# Patient Record
Sex: MALE | Race: WHITE | NOT HISPANIC OR LATINO
[De-identification: names, ages, dates, MRNs, and addresses within clinical notes are randomized per-mention and may not be internally consistent; named-entity substitution may affect disease eponyms.]

---

## 2017-01-01 ENCOUNTER — APPOINTMENT (OUTPATIENT)
Dept: CARDIOLOGY | Facility: CLINIC | Age: 67
End: 2017-01-01

## 2017-01-01 ENCOUNTER — APPOINTMENT (OUTPATIENT)
Dept: NUCLEAR MEDICINE | Facility: CLINIC | Age: 67
End: 2017-01-01

## 2017-01-01 ENCOUNTER — OUTPATIENT (OUTPATIENT)
Dept: OUTPATIENT SERVICES | Facility: HOSPITAL | Age: 67
LOS: 1 days | End: 2017-01-01
Payer: COMMERCIAL

## 2017-01-01 ENCOUNTER — NON-APPOINTMENT (OUTPATIENT)
Age: 67
End: 2017-01-01

## 2017-01-01 ENCOUNTER — INPATIENT (INPATIENT)
Facility: HOSPITAL | Age: 67
LOS: 3 days | Discharge: HOME | DRG: 181 | End: 2017-05-10
Attending: INTERNAL MEDICINE | Admitting: INTERNAL MEDICINE
Payer: COMMERCIAL

## 2017-01-01 ENCOUNTER — TRANSCRIPTION ENCOUNTER (OUTPATIENT)
Age: 67
End: 2017-01-01

## 2017-01-01 ENCOUNTER — RX RENEWAL (OUTPATIENT)
Age: 67
End: 2017-01-01

## 2017-01-01 ENCOUNTER — APPOINTMENT (OUTPATIENT)
Dept: THORACIC SURGERY | Facility: CLINIC | Age: 67
End: 2017-01-01

## 2017-01-01 ENCOUNTER — INPATIENT (INPATIENT)
Facility: HOSPITAL | Age: 67
LOS: 2 days | Discharge: HOME | DRG: 180 | End: 2017-06-16
Attending: HOSPITALIST | Admitting: THORACIC SURGERY (CARDIOTHORACIC VASCULAR SURGERY)
Payer: COMMERCIAL

## 2017-01-01 ENCOUNTER — APPOINTMENT (OUTPATIENT)
Dept: CT IMAGING | Facility: CLINIC | Age: 67
End: 2017-01-01

## 2017-01-01 ENCOUNTER — OUTPATIENT (OUTPATIENT)
Dept: OUTPATIENT SERVICES | Facility: HOSPITAL | Age: 67
LOS: 1 days | End: 2017-01-01

## 2017-01-01 ENCOUNTER — MEDICATION RENEWAL (OUTPATIENT)
Age: 67
End: 2017-01-01

## 2017-01-01 ENCOUNTER — APPOINTMENT (OUTPATIENT)
Dept: PULMONOLOGY | Facility: CLINIC | Age: 67
End: 2017-01-01

## 2017-01-01 ENCOUNTER — APPOINTMENT (OUTPATIENT)
Dept: THORACIC SURGERY | Facility: HOSPITAL | Age: 67
End: 2017-01-01

## 2017-01-01 ENCOUNTER — INPATIENT (INPATIENT)
Facility: HOSPITAL | Age: 67
LOS: 3 days | Discharge: HOME | DRG: 167 | End: 2017-05-28
Attending: THORACIC SURGERY (CARDIOTHORACIC VASCULAR SURGERY) | Admitting: FAMILY MEDICINE
Payer: COMMERCIAL

## 2017-01-01 VITALS
TEMPERATURE: 98 F | OXYGEN SATURATION: 91 % | RESPIRATION RATE: 22 BRPM | HEART RATE: 102 BPM | SYSTOLIC BLOOD PRESSURE: 133 MMHG | DIASTOLIC BLOOD PRESSURE: 64 MMHG | HEIGHT: 71 IN | WEIGHT: 238.1 LBS

## 2017-01-01 VITALS — DIASTOLIC BLOOD PRESSURE: 78 MMHG | SYSTOLIC BLOOD PRESSURE: 122 MMHG | OXYGEN SATURATION: 94 % | HEART RATE: 99 BPM

## 2017-01-01 VITALS
DIASTOLIC BLOOD PRESSURE: 58 MMHG | WEIGHT: 228 LBS | OXYGEN SATURATION: 90 % | SYSTOLIC BLOOD PRESSURE: 122 MMHG | HEART RATE: 74 BPM | BODY MASS INDEX: 31.8 KG/M2

## 2017-01-01 VITALS
HEART RATE: 74 BPM | OXYGEN SATURATION: 92 % | DIASTOLIC BLOOD PRESSURE: 64 MMHG | RESPIRATION RATE: 18 BRPM | TEMPERATURE: 98 F | SYSTOLIC BLOOD PRESSURE: 104 MMHG

## 2017-01-01 VITALS
OXYGEN SATURATION: 96 % | SYSTOLIC BLOOD PRESSURE: 106 MMHG | WEIGHT: 234 LBS | BODY MASS INDEX: 32.64 KG/M2 | DIASTOLIC BLOOD PRESSURE: 66 MMHG | HEART RATE: 74 BPM

## 2017-01-01 VITALS
WEIGHT: 225 LBS | BODY MASS INDEX: 31.5 KG/M2 | HEIGHT: 71 IN | OXYGEN SATURATION: 96 % | DIASTOLIC BLOOD PRESSURE: 78 MMHG | SYSTOLIC BLOOD PRESSURE: 143 MMHG | HEART RATE: 83 BPM

## 2017-01-01 VITALS
BODY MASS INDEX: 32.76 KG/M2 | DIASTOLIC BLOOD PRESSURE: 64 MMHG | RESPIRATION RATE: 16 BRPM | WEIGHT: 234 LBS | HEIGHT: 71 IN | OXYGEN SATURATION: 93 % | SYSTOLIC BLOOD PRESSURE: 109 MMHG | HEART RATE: 82 BPM

## 2017-01-01 VITALS
HEIGHT: 71 IN | WEIGHT: 229.94 LBS | HEART RATE: 100 BPM | DIASTOLIC BLOOD PRESSURE: 76 MMHG | TEMPERATURE: 97 F | SYSTOLIC BLOOD PRESSURE: 138 MMHG | OXYGEN SATURATION: 93 % | RESPIRATION RATE: 18 BRPM

## 2017-01-01 VITALS
DIASTOLIC BLOOD PRESSURE: 73 MMHG | SYSTOLIC BLOOD PRESSURE: 133 MMHG | BODY MASS INDEX: 33.88 KG/M2 | HEART RATE: 113 BPM | WEIGHT: 242 LBS | OXYGEN SATURATION: 95 % | HEIGHT: 71 IN

## 2017-01-01 VITALS
BODY MASS INDEX: 33.6 KG/M2 | DIASTOLIC BLOOD PRESSURE: 78 MMHG | WEIGHT: 240 LBS | HEART RATE: 112 BPM | HEIGHT: 71 IN | OXYGEN SATURATION: 96 % | SYSTOLIC BLOOD PRESSURE: 129 MMHG

## 2017-01-01 VITALS
RESPIRATION RATE: 16 BRPM | OXYGEN SATURATION: 96 % | TEMPERATURE: 98 F | SYSTOLIC BLOOD PRESSURE: 112 MMHG | DIASTOLIC BLOOD PRESSURE: 62 MMHG | HEART RATE: 76 BPM

## 2017-01-01 VITALS
HEART RATE: 80 BPM | DIASTOLIC BLOOD PRESSURE: 60 MMHG | SYSTOLIC BLOOD PRESSURE: 140 MMHG | RESPIRATION RATE: 20 BRPM | OXYGEN SATURATION: 90 %

## 2017-01-01 VITALS — TEMPERATURE: 98 F

## 2017-01-01 VITALS — OXYGEN SATURATION: 91 %

## 2017-01-01 DIAGNOSIS — I10 ESSENTIAL (PRIMARY) HYPERTENSION: ICD-10-CM

## 2017-01-01 DIAGNOSIS — C34.90 MALIGNANT NEOPLASM OF UNSPECIFIED PART OF UNSPECIFIED BRONCHUS OR LUNG: ICD-10-CM

## 2017-01-01 DIAGNOSIS — J90 PLEURAL EFFUSION, NOT ELSEWHERE CLASSIFIED: ICD-10-CM

## 2017-01-01 DIAGNOSIS — Z87.891 PERSONAL HISTORY OF NICOTINE DEPENDENCE: ICD-10-CM

## 2017-01-01 DIAGNOSIS — R60.0 LOCALIZED EDEMA: ICD-10-CM

## 2017-01-01 DIAGNOSIS — C79.9 SECONDARY MALIGNANT NEOPLASM OF UNSPECIFIED SITE: ICD-10-CM

## 2017-01-01 DIAGNOSIS — E78.00 PURE HYPERCHOLESTEROLEMIA, UNSPECIFIED: ICD-10-CM

## 2017-01-01 DIAGNOSIS — C34.91 MALIGNANT NEOPLASM OF UNSPECIFIED PART OF RIGHT BRONCHUS OR LUNG: ICD-10-CM

## 2017-01-01 DIAGNOSIS — R06.02 SHORTNESS OF BREATH: ICD-10-CM

## 2017-01-01 DIAGNOSIS — Z86.39 PERSONAL HISTORY OF OTHER ENDOCRINE, NUTRITIONAL AND METABOLIC DISEASE: ICD-10-CM

## 2017-01-01 DIAGNOSIS — Z00.8 ENCOUNTER FOR OTHER GENERAL EXAMINATION: ICD-10-CM

## 2017-01-01 DIAGNOSIS — J91.0 MALIGNANT PLEURAL EFFUSION: ICD-10-CM

## 2017-01-01 DIAGNOSIS — I50.9 HEART FAILURE, UNSPECIFIED: ICD-10-CM

## 2017-01-01 DIAGNOSIS — Z01.810 ENCOUNTER FOR PREPROCEDURAL CARDIOVASCULAR EXAMINATION: ICD-10-CM

## 2017-01-01 DIAGNOSIS — C34.81 MALIGNANT NEOPLASM OF OVERLAPPING SITES OF RIGHT BRONCHUS AND LUNG: ICD-10-CM

## 2017-01-01 DIAGNOSIS — I48.91 UNSPECIFIED ATRIAL FIBRILLATION: ICD-10-CM

## 2017-01-01 DIAGNOSIS — I48.2 CHRONIC ATRIAL FIBRILLATION: ICD-10-CM

## 2017-01-01 DIAGNOSIS — I31.3 PERICARDIAL EFFUSION (NONINFLAMMATORY): ICD-10-CM

## 2017-01-01 DIAGNOSIS — Z29.9 ENCOUNTER FOR PROPHYLACTIC MEASURES, UNSPECIFIED: ICD-10-CM

## 2017-01-01 DIAGNOSIS — E11.9 TYPE 2 DIABETES MELLITUS WITHOUT COMPLICATIONS: ICD-10-CM

## 2017-01-01 DIAGNOSIS — R05 COUGH: ICD-10-CM

## 2017-01-01 DIAGNOSIS — Z51.5 ENCOUNTER FOR PALLIATIVE CARE: ICD-10-CM

## 2017-01-01 DIAGNOSIS — R91.8 OTHER NONSPECIFIC ABNORMAL FINDING OF LUNG FIELD: ICD-10-CM

## 2017-01-01 DIAGNOSIS — C34.11 MALIGNANT NEOPLASM OF UPPER LOBE, RIGHT BRONCHUS OR LUNG: ICD-10-CM

## 2017-01-01 DIAGNOSIS — R91.1 SOLITARY PULMONARY NODULE: ICD-10-CM

## 2017-01-01 LAB
ALBUMIN FLD-MCNC: 2.7 G/DL — SIGNIFICANT CHANGE UP
ALBUMIN SERPL ELPH-MCNC: 2.7 G/DL — LOW (ref 3.3–5.2)
ALBUMIN SERPL ELPH-MCNC: 3.4 G/DL — SIGNIFICANT CHANGE UP (ref 3.3–5.2)
ALBUMIN SERPL ELPH-MCNC: 3.5 G/DL — SIGNIFICANT CHANGE UP (ref 3.3–5.2)
ALP SERPL-CCNC: 133 U/L — HIGH (ref 40–120)
ALP SERPL-CCNC: 93 U/L — SIGNIFICANT CHANGE UP (ref 40–120)
ALP SERPL-CCNC: 94 U/L — SIGNIFICANT CHANGE UP (ref 40–120)
ALT FLD-CCNC: 17 U/L — SIGNIFICANT CHANGE UP
ALT FLD-CCNC: 8 U/L — SIGNIFICANT CHANGE UP
ALT FLD-CCNC: 8 U/L — SIGNIFICANT CHANGE UP
ANION GAP SERPL CALC-SCNC: 11 MMOL/L — SIGNIFICANT CHANGE UP (ref 5–17)
ANION GAP SERPL CALC-SCNC: 12 MMOL/L — SIGNIFICANT CHANGE UP (ref 5–17)
ANION GAP SERPL CALC-SCNC: 12 MMOL/L — SIGNIFICANT CHANGE UP (ref 5–17)
ANION GAP SERPL CALC-SCNC: 13 MMOL/L — SIGNIFICANT CHANGE UP (ref 5–17)
ANION GAP SERPL CALC-SCNC: 14 MMOL/L — SIGNIFICANT CHANGE UP (ref 5–17)
ANION GAP SERPL CALC-SCNC: 14 MMOL/L — SIGNIFICANT CHANGE UP (ref 5–17)
ANION GAP SERPL CALC-SCNC: 15 MMOL/L — SIGNIFICANT CHANGE UP (ref 5–17)
APTT BLD: 35.4 SEC — SIGNIFICANT CHANGE UP (ref 27.5–37.4)
APTT BLD: 38.4 SEC — HIGH (ref 27.5–37.4)
AST SERPL-CCNC: 11 U/L — SIGNIFICANT CHANGE UP
AST SERPL-CCNC: 11 U/L — SIGNIFICANT CHANGE UP
AST SERPL-CCNC: 27 U/L — SIGNIFICANT CHANGE UP
BASOPHILS # BLD AUTO: 0 K/UL — SIGNIFICANT CHANGE UP (ref 0–0.2)
BASOPHILS NFR BLD AUTO: 0.5 % — SIGNIFICANT CHANGE UP (ref 0–2)
BILIRUB SERPL-MCNC: 0.3 MG/DL — LOW (ref 0.4–2)
BILIRUB SERPL-MCNC: 0.4 MG/DL — SIGNIFICANT CHANGE UP (ref 0.4–2)
BILIRUB SERPL-MCNC: 0.6 MG/DL — SIGNIFICANT CHANGE UP (ref 0.4–2)
BLD GP AB SCN SERPL QL: SIGNIFICANT CHANGE UP
BUN SERPL-MCNC: 14 MG/DL — SIGNIFICANT CHANGE UP (ref 8–20)
BUN SERPL-MCNC: 17 MG/DL — SIGNIFICANT CHANGE UP (ref 8–20)
BUN SERPL-MCNC: 18 MG/DL — SIGNIFICANT CHANGE UP (ref 8–20)
BUN SERPL-MCNC: 21 MG/DL — HIGH (ref 8–20)
BUN SERPL-MCNC: 23 MG/DL — HIGH (ref 8–20)
BUN SERPL-MCNC: 23 MG/DL — HIGH (ref 8–20)
BUN SERPL-MCNC: 26 MG/DL — HIGH (ref 8–20)
BUN SERPL-MCNC: 26 MG/DL — HIGH (ref 8–20)
BUN SERPL-MCNC: 28 MG/DL — HIGH (ref 8–20)
BUN SERPL-MCNC: 28 MG/DL — HIGH (ref 8–20)
BUN SERPL-MCNC: 29 MG/DL — HIGH (ref 8–20)
BUN SERPL-MCNC: 38 MG/DL — HIGH (ref 8–20)
BUN SERPL-MCNC: 39 MG/DL — HIGH (ref 8–20)
CALCIUM SERPL-MCNC: 8.6 MG/DL — SIGNIFICANT CHANGE UP (ref 8.6–10.2)
CALCIUM SERPL-MCNC: 8.6 MG/DL — SIGNIFICANT CHANGE UP (ref 8.6–10.2)
CALCIUM SERPL-MCNC: 8.7 MG/DL — SIGNIFICANT CHANGE UP (ref 8.6–10.2)
CALCIUM SERPL-MCNC: 8.8 MG/DL — SIGNIFICANT CHANGE UP (ref 8.6–10.2)
CALCIUM SERPL-MCNC: 8.9 MG/DL — SIGNIFICANT CHANGE UP (ref 8.6–10.2)
CALCIUM SERPL-MCNC: 9 MG/DL — SIGNIFICANT CHANGE UP (ref 8.6–10.2)
CALCIUM SERPL-MCNC: 9 MG/DL — SIGNIFICANT CHANGE UP (ref 8.6–10.2)
CALCIUM SERPL-MCNC: 9.2 MG/DL — SIGNIFICANT CHANGE UP (ref 8.6–10.2)
CALCIUM SERPL-MCNC: 9.3 MG/DL — SIGNIFICANT CHANGE UP (ref 8.6–10.2)
CALCIUM SERPL-MCNC: 9.5 MG/DL — SIGNIFICANT CHANGE UP (ref 8.6–10.2)
CHLORIDE SERPL-SCNC: 100 MMOL/L — SIGNIFICANT CHANGE UP (ref 98–107)
CHLORIDE SERPL-SCNC: 89 MMOL/L — LOW (ref 98–107)
CHLORIDE SERPL-SCNC: 90 MMOL/L — LOW (ref 98–107)
CHLORIDE SERPL-SCNC: 91 MMOL/L — LOW (ref 98–107)
CHLORIDE SERPL-SCNC: 92 MMOL/L — LOW (ref 98–107)
CHLORIDE SERPL-SCNC: 95 MMOL/L — LOW (ref 98–107)
CHLORIDE SERPL-SCNC: 95 MMOL/L — LOW (ref 98–107)
CHLORIDE SERPL-SCNC: 96 MMOL/L — LOW (ref 98–107)
CHLORIDE SERPL-SCNC: 96 MMOL/L — LOW (ref 98–107)
CHLORIDE SERPL-SCNC: 97 MMOL/L — LOW (ref 98–107)
CHLORIDE SERPL-SCNC: 98 MMOL/L — SIGNIFICANT CHANGE UP (ref 98–107)
CHLORIDE SERPL-SCNC: 98 MMOL/L — SIGNIFICANT CHANGE UP (ref 98–107)
CHLORIDE SERPL-SCNC: 99 MMOL/L — SIGNIFICANT CHANGE UP (ref 98–107)
CO2 SERPL-SCNC: 24 MMOL/L — SIGNIFICANT CHANGE UP (ref 22–29)
CO2 SERPL-SCNC: 24 MMOL/L — SIGNIFICANT CHANGE UP (ref 22–29)
CO2 SERPL-SCNC: 25 MMOL/L — SIGNIFICANT CHANGE UP (ref 22–29)
CO2 SERPL-SCNC: 25 MMOL/L — SIGNIFICANT CHANGE UP (ref 22–29)
CO2 SERPL-SCNC: 26 MMOL/L — SIGNIFICANT CHANGE UP (ref 22–29)
CO2 SERPL-SCNC: 27 MMOL/L — SIGNIFICANT CHANGE UP (ref 22–29)
CO2 SERPL-SCNC: 27 MMOL/L — SIGNIFICANT CHANGE UP (ref 22–29)
CO2 SERPL-SCNC: 29 MMOL/L — SIGNIFICANT CHANGE UP (ref 22–29)
CO2 SERPL-SCNC: 29 MMOL/L — SIGNIFICANT CHANGE UP (ref 22–29)
CO2 SERPL-SCNC: 31 MMOL/L — HIGH (ref 22–29)
CREAT SERPL-MCNC: 0.6 MG/DL — SIGNIFICANT CHANGE UP (ref 0.5–1.3)
CREAT SERPL-MCNC: 0.6 MG/DL — SIGNIFICANT CHANGE UP (ref 0.5–1.3)
CREAT SERPL-MCNC: 0.63 MG/DL — SIGNIFICANT CHANGE UP (ref 0.5–1.3)
CREAT SERPL-MCNC: 0.65 MG/DL — SIGNIFICANT CHANGE UP (ref 0.5–1.3)
CREAT SERPL-MCNC: 0.71 MG/DL — SIGNIFICANT CHANGE UP (ref 0.5–1.3)
CREAT SERPL-MCNC: 0.71 MG/DL — SIGNIFICANT CHANGE UP (ref 0.5–1.3)
CREAT SERPL-MCNC: 0.73 MG/DL — SIGNIFICANT CHANGE UP (ref 0.5–1.3)
CREAT SERPL-MCNC: 0.73 MG/DL — SIGNIFICANT CHANGE UP (ref 0.5–1.3)
CREAT SERPL-MCNC: 0.76 MG/DL — SIGNIFICANT CHANGE UP (ref 0.5–1.3)
CREAT SERPL-MCNC: 0.77 MG/DL — SIGNIFICANT CHANGE UP (ref 0.5–1.3)
CREAT SERPL-MCNC: 0.79 MG/DL — SIGNIFICANT CHANGE UP (ref 0.5–1.3)
CREAT SERPL-MCNC: 0.83 MG/DL — SIGNIFICANT CHANGE UP (ref 0.5–1.3)
CREAT SERPL-MCNC: 0.96 MG/DL — SIGNIFICANT CHANGE UP (ref 0.5–1.3)
D DIMER BLD IA.RAPID-MCNC: 176 NG/ML DDU — SIGNIFICANT CHANGE UP
DIGOXIN SERPL-MCNC: 1 NG/ML — SIGNIFICANT CHANGE UP (ref 0.8–2)
EOSINOPHIL # BLD AUTO: 0.1 K/UL — SIGNIFICANT CHANGE UP (ref 0–0.5)
EOSINOPHIL NFR BLD AUTO: 1.5 % — SIGNIFICANT CHANGE UP (ref 0–6)
GLUCOSE FLD-MCNC: 25 MG/DL — SIGNIFICANT CHANGE UP
GLUCOSE SERPL-MCNC: 122 MG/DL — HIGH (ref 70–115)
GLUCOSE SERPL-MCNC: 135 MG/DL — HIGH (ref 70–115)
GLUCOSE SERPL-MCNC: 136 MG/DL — HIGH (ref 70–115)
GLUCOSE SERPL-MCNC: 141 MG/DL — HIGH (ref 70–115)
GLUCOSE SERPL-MCNC: 143 MG/DL — HIGH (ref 70–115)
GLUCOSE SERPL-MCNC: 144 MG/DL — HIGH (ref 70–115)
GLUCOSE SERPL-MCNC: 146 MG/DL — HIGH (ref 70–115)
GLUCOSE SERPL-MCNC: 150 MG/DL — HIGH (ref 70–115)
GLUCOSE SERPL-MCNC: 154 MG/DL — HIGH (ref 70–115)
GLUCOSE SERPL-MCNC: 156 MG/DL — HIGH (ref 70–115)
GLUCOSE SERPL-MCNC: 156 MG/DL — HIGH (ref 70–115)
GLUCOSE SERPL-MCNC: 158 MG/DL — HIGH (ref 70–115)
GLUCOSE SERPL-MCNC: 172 MG/DL — HIGH (ref 70–115)
HBA1C BLD-MCNC: 5.9 % — HIGH (ref 4–5.6)
HCT VFR BLD CALC: 34.7 % — LOW (ref 42–52)
HCT VFR BLD CALC: 36.4 % — LOW (ref 42–52)
HCT VFR BLD CALC: 37.1 % — LOW (ref 42–52)
HCT VFR BLD CALC: 37.5 % — LOW (ref 42–52)
HCT VFR BLD CALC: 38.3 % — LOW (ref 42–52)
HCT VFR BLD CALC: 38.6 % — LOW (ref 42–52)
HCT VFR BLD CALC: 39.7 % — LOW (ref 42–52)
HCT VFR BLD CALC: 40.4 % — LOW (ref 42–52)
HCT VFR BLD CALC: 40.9 % — LOW (ref 42–52)
HCT VFR BLD CALC: 41.1 % — LOW (ref 42–52)
HCT VFR BLD CALC: 44.1 % — SIGNIFICANT CHANGE UP (ref 42–52)
HGB BLD-MCNC: 11.2 G/DL — LOW (ref 14–18)
HGB BLD-MCNC: 11.7 G/DL — LOW (ref 14–18)
HGB BLD-MCNC: 11.9 G/DL — LOW (ref 14–18)
HGB BLD-MCNC: 12.3 G/DL — LOW (ref 14–18)
HGB BLD-MCNC: 12.4 G/DL — LOW (ref 14–18)
HGB BLD-MCNC: 12.5 G/DL — LOW (ref 14–18)
HGB BLD-MCNC: 12.7 G/DL — LOW (ref 14–18)
HGB BLD-MCNC: 12.7 G/DL — LOW (ref 14–18)
HGB BLD-MCNC: 12.8 G/DL — LOW (ref 14–18)
HGB BLD-MCNC: 12.9 G/DL — LOW (ref 14–18)
HGB BLD-MCNC: 13.4 G/DL — LOW (ref 14–18)
HGB BLD-MCNC: 13.4 G/DL — LOW (ref 14–18)
HGB BLD-MCNC: 14.3 G/DL — SIGNIFICANT CHANGE UP (ref 14–18)
INR BLD: 1.25 RATIO — HIGH (ref 0.88–1.16)
INR BLD: 1.28 RATIO — HIGH (ref 0.88–1.16)
INR BLD: 1.34 RATIO — HIGH (ref 0.88–1.16)
INR BLD: 1.63 RATIO — HIGH (ref 0.88–1.16)
LDH SERPL L TO P-CCNC: 225 U/L — HIGH (ref 98–192)
LDH SERPL L TO P-CCNC: 228 U/L — HIGH (ref 98–192)
LDH SERPL L TO P-CCNC: 2720 U/L — SIGNIFICANT CHANGE UP
LYMPHOCYTES # BLD AUTO: 1.2 K/UL — SIGNIFICANT CHANGE UP (ref 1–4.8)
LYMPHOCYTES # BLD AUTO: 20.4 % — SIGNIFICANT CHANGE UP (ref 20–55)
LYMPHOCYTES # BLD AUTO: 6 % — LOW (ref 20–55)
MAGNESIUM SERPL-MCNC: 1.8 MG/DL — SIGNIFICANT CHANGE UP (ref 1.6–2.6)
MAGNESIUM SERPL-MCNC: 1.8 MG/DL — SIGNIFICANT CHANGE UP (ref 1.6–2.6)
MAGNESIUM SERPL-MCNC: 2 MG/DL — SIGNIFICANT CHANGE UP (ref 1.6–2.6)
MAGNESIUM SERPL-MCNC: 2 MG/DL — SIGNIFICANT CHANGE UP (ref 1.8–2.5)
MAGNESIUM SERPL-MCNC: 2.1 MG/DL — SIGNIFICANT CHANGE UP (ref 1.6–2.6)
MAGNESIUM SERPL-MCNC: 2.6 MG/DL — SIGNIFICANT CHANGE UP (ref 1.8–2.6)
MCHC RBC-ENTMCNC: 27 PG — SIGNIFICANT CHANGE UP (ref 27–31)
MCHC RBC-ENTMCNC: 27 PG — SIGNIFICANT CHANGE UP (ref 27–31)
MCHC RBC-ENTMCNC: 27.2 PG — SIGNIFICANT CHANGE UP (ref 27–31)
MCHC RBC-ENTMCNC: 27.7 PG — SIGNIFICANT CHANGE UP (ref 27–31)
MCHC RBC-ENTMCNC: 28.2 PG — SIGNIFICANT CHANGE UP (ref 27–31)
MCHC RBC-ENTMCNC: 28.3 PG — SIGNIFICANT CHANGE UP (ref 27–31)
MCHC RBC-ENTMCNC: 28.4 PG — SIGNIFICANT CHANGE UP (ref 27–31)
MCHC RBC-ENTMCNC: 28.5 PG — SIGNIFICANT CHANGE UP (ref 27–31)
MCHC RBC-ENTMCNC: 28.5 PG — SIGNIFICANT CHANGE UP (ref 27–31)
MCHC RBC-ENTMCNC: 29.1 PG — SIGNIFICANT CHANGE UP (ref 27–31)
MCHC RBC-ENTMCNC: 29.2 PG — SIGNIFICANT CHANGE UP (ref 27–31)
MCHC RBC-ENTMCNC: 29.3 PG — SIGNIFICANT CHANGE UP (ref 27–31)
MCHC RBC-ENTMCNC: 29.4 PG — SIGNIFICANT CHANGE UP (ref 27–31)
MCHC RBC-ENTMCNC: 31.4 G/DL — LOW (ref 32–36)
MCHC RBC-ENTMCNC: 32 G/DL — SIGNIFICANT CHANGE UP (ref 32–36)
MCHC RBC-ENTMCNC: 32.1 G/DL — SIGNIFICANT CHANGE UP (ref 32–36)
MCHC RBC-ENTMCNC: 32.2 G/DL — SIGNIFICANT CHANGE UP (ref 32–36)
MCHC RBC-ENTMCNC: 32.3 G/DL — SIGNIFICANT CHANGE UP (ref 32–36)
MCHC RBC-ENTMCNC: 32.4 G/DL — SIGNIFICANT CHANGE UP (ref 32–36)
MCHC RBC-ENTMCNC: 32.5 G/DL — SIGNIFICANT CHANGE UP (ref 32–36)
MCHC RBC-ENTMCNC: 32.6 G/DL — SIGNIFICANT CHANGE UP (ref 32–36)
MCHC RBC-ENTMCNC: 32.6 G/DL — SIGNIFICANT CHANGE UP (ref 32–36)
MCHC RBC-ENTMCNC: 32.8 G/DL — SIGNIFICANT CHANGE UP (ref 32–36)
MCHC RBC-ENTMCNC: 32.8 G/DL — SIGNIFICANT CHANGE UP (ref 32–36)
MCV RBC AUTO: 83.9 FL — SIGNIFICANT CHANGE UP (ref 80–94)
MCV RBC AUTO: 84.1 FL — SIGNIFICANT CHANGE UP (ref 80–94)
MCV RBC AUTO: 84.2 FL — SIGNIFICANT CHANGE UP (ref 80–94)
MCV RBC AUTO: 86.2 FL — SIGNIFICANT CHANGE UP (ref 80–94)
MCV RBC AUTO: 86.3 FL — SIGNIFICANT CHANGE UP (ref 80–94)
MCV RBC AUTO: 87.8 FL — SIGNIFICANT CHANGE UP (ref 80–94)
MCV RBC AUTO: 87.8 FL — SIGNIFICANT CHANGE UP (ref 80–94)
MCV RBC AUTO: 88.8 FL — SIGNIFICANT CHANGE UP (ref 80–94)
MCV RBC AUTO: 88.9 FL — SIGNIFICANT CHANGE UP (ref 80–94)
MCV RBC AUTO: 89.7 FL — SIGNIFICANT CHANGE UP (ref 80–94)
MCV RBC AUTO: 89.8 FL — SIGNIFICANT CHANGE UP (ref 80–94)
MCV RBC AUTO: 89.9 FL — SIGNIFICANT CHANGE UP (ref 80–94)
MCV RBC AUTO: 90.6 FL — SIGNIFICANT CHANGE UP (ref 80–94)
MONOCYTES # BLD AUTO: 0.9 K/UL — HIGH (ref 0–0.8)
MONOCYTES NFR BLD AUTO: 14.7 % — HIGH (ref 3–10)
MONOCYTES NFR BLD AUTO: 5 % — SIGNIFICANT CHANGE UP (ref 3–10)
NEUTROPHILS # BLD AUTO: 3.9 K/UL — SIGNIFICANT CHANGE UP (ref 1.8–8)
NEUTROPHILS NFR BLD AUTO: 62.7 % — SIGNIFICANT CHANGE UP (ref 37–73)
NEUTROPHILS NFR BLD AUTO: 89 % — HIGH (ref 37–73)
NT-PROBNP SERPL-SCNC: 363 PG/ML — HIGH (ref 0–300)
PH FLD: 7.5 — SIGNIFICANT CHANGE UP
PHOSPHATE SERPL-MCNC: 3.8 MG/DL — SIGNIFICANT CHANGE UP (ref 2.4–4.7)
PHOSPHATE SERPL-MCNC: 3.8 MG/DL — SIGNIFICANT CHANGE UP (ref 2.4–4.7)
PHOSPHATE SERPL-MCNC: 4 MG/DL — SIGNIFICANT CHANGE UP (ref 2.4–4.7)
PHOSPHATE SERPL-MCNC: 4.4 MG/DL — SIGNIFICANT CHANGE UP (ref 2.4–4.7)
PHOSPHATE SERPL-MCNC: 4.7 MG/DL — SIGNIFICANT CHANGE UP (ref 2.4–4.7)
PLATELET # BLD AUTO: 349 K/UL — SIGNIFICANT CHANGE UP (ref 150–400)
PLATELET # BLD AUTO: 357 K/UL — SIGNIFICANT CHANGE UP (ref 150–400)
PLATELET # BLD AUTO: 366 K/UL — SIGNIFICANT CHANGE UP (ref 150–400)
PLATELET # BLD AUTO: 367 K/UL — SIGNIFICANT CHANGE UP (ref 150–400)
PLATELET # BLD AUTO: 384 K/UL — SIGNIFICANT CHANGE UP (ref 150–400)
PLATELET # BLD AUTO: 405 K/UL — HIGH (ref 150–400)
PLATELET # BLD AUTO: 406 K/UL — HIGH (ref 150–400)
PLATELET # BLD AUTO: 429 K/UL — HIGH (ref 150–400)
PLATELET # BLD AUTO: 450 K/UL — HIGH (ref 150–400)
PLATELET # BLD AUTO: 451 K/UL — HIGH (ref 150–400)
PLATELET # BLD AUTO: 496 K/UL — HIGH (ref 150–400)
PLATELET # BLD AUTO: 508 K/UL — HIGH (ref 150–400)
PLATELET # BLD AUTO: 519 K/UL — HIGH (ref 150–400)
POTASSIUM SERPL-MCNC: 3.7 MMOL/L — SIGNIFICANT CHANGE UP (ref 3.5–5.3)
POTASSIUM SERPL-MCNC: 3.8 MMOL/L — SIGNIFICANT CHANGE UP (ref 3.5–5.3)
POTASSIUM SERPL-MCNC: 4 MMOL/L — SIGNIFICANT CHANGE UP (ref 3.5–5.3)
POTASSIUM SERPL-MCNC: 4.2 MMOL/L — SIGNIFICANT CHANGE UP (ref 3.5–5.3)
POTASSIUM SERPL-MCNC: 4.3 MMOL/L — SIGNIFICANT CHANGE UP (ref 3.5–5.3)
POTASSIUM SERPL-MCNC: 4.3 MMOL/L — SIGNIFICANT CHANGE UP (ref 3.5–5.3)
POTASSIUM SERPL-MCNC: 4.4 MMOL/L — SIGNIFICANT CHANGE UP (ref 3.5–5.3)
POTASSIUM SERPL-MCNC: 4.4 MMOL/L — SIGNIFICANT CHANGE UP (ref 3.5–5.3)
POTASSIUM SERPL-MCNC: 4.5 MMOL/L — SIGNIFICANT CHANGE UP (ref 3.5–5.3)
POTASSIUM SERPL-SCNC: 3.7 MMOL/L — SIGNIFICANT CHANGE UP (ref 3.5–5.3)
POTASSIUM SERPL-SCNC: 3.8 MMOL/L — SIGNIFICANT CHANGE UP (ref 3.5–5.3)
POTASSIUM SERPL-SCNC: 4 MMOL/L — SIGNIFICANT CHANGE UP (ref 3.5–5.3)
POTASSIUM SERPL-SCNC: 4.2 MMOL/L — SIGNIFICANT CHANGE UP (ref 3.5–5.3)
POTASSIUM SERPL-SCNC: 4.3 MMOL/L — SIGNIFICANT CHANGE UP (ref 3.5–5.3)
POTASSIUM SERPL-SCNC: 4.3 MMOL/L — SIGNIFICANT CHANGE UP (ref 3.5–5.3)
POTASSIUM SERPL-SCNC: 4.4 MMOL/L — SIGNIFICANT CHANGE UP (ref 3.5–5.3)
POTASSIUM SERPL-SCNC: 4.4 MMOL/L — SIGNIFICANT CHANGE UP (ref 3.5–5.3)
POTASSIUM SERPL-SCNC: 4.5 MMOL/L — SIGNIFICANT CHANGE UP (ref 3.5–5.3)
PROT FLD-MCNC: 4.3 G/DL — SIGNIFICANT CHANGE UP
PROT SERPL-MCNC: 6 G/DL — LOW (ref 6.6–8.7)
PROT SERPL-MCNC: 6.9 G/DL — SIGNIFICANT CHANGE UP (ref 6.6–8.7)
PROT SERPL-MCNC: 7 G/DL — SIGNIFICANT CHANGE UP (ref 6.6–8.7)
PROTHROM AB SERPL-ACNC: 13.8 SEC — HIGH (ref 9.8–12.7)
PROTHROM AB SERPL-ACNC: 14.1 SEC — HIGH (ref 9.8–12.7)
PROTHROM AB SERPL-ACNC: 14.8 SEC — HIGH (ref 9.8–12.7)
PROTHROM AB SERPL-ACNC: 18.1 SEC — HIGH (ref 9.8–12.7)
RBC # BLD: 4.12 M/UL — LOW (ref 4.6–6.2)
RBC # BLD: 4.22 M/UL — LOW (ref 4.6–6.2)
RBC # BLD: 4.34 M/UL — LOW (ref 4.6–6.2)
RBC # BLD: 4.41 M/UL — LOW (ref 4.6–6.2)
RBC # BLD: 4.42 M/UL — LOW (ref 4.6–6.2)
RBC # BLD: 4.44 M/UL — LOW (ref 4.6–6.2)
RBC # BLD: 4.46 M/UL — LOW (ref 4.6–6.2)
RBC # BLD: 4.47 M/UL — LOW (ref 4.6–6.2)
RBC # BLD: 4.48 M/UL — LOW (ref 4.6–6.2)
RBC # BLD: 4.52 M/UL — LOW (ref 4.6–6.2)
RBC # BLD: 4.56 M/UL — LOW (ref 4.6–6.2)
RBC # BLD: 4.57 M/UL — LOW (ref 4.6–6.2)
RBC # BLD: 5.02 M/UL — SIGNIFICANT CHANGE UP (ref 4.6–6.2)
RBC # FLD: 17.7 % — HIGH (ref 11–15.6)
RBC # FLD: 17.8 % — HIGH (ref 11–15.6)
RBC # FLD: 17.9 % — HIGH (ref 11–15.6)
RBC # FLD: 17.9 % — HIGH (ref 11–15.6)
RBC # FLD: 18 % — HIGH (ref 11–15.6)
RBC # FLD: 18.1 % — HIGH (ref 11–15.6)
RBC # FLD: 18.2 % — HIGH (ref 11–15.6)
RBC # FLD: 18.3 % — HIGH (ref 11–15.6)
SODIUM SERPL-SCNC: 131 MMOL/L — LOW (ref 135–145)
SODIUM SERPL-SCNC: 132 MMOL/L — LOW (ref 135–145)
SODIUM SERPL-SCNC: 133 MMOL/L — LOW (ref 135–145)
SODIUM SERPL-SCNC: 134 MMOL/L — LOW (ref 135–145)
SODIUM SERPL-SCNC: 134 MMOL/L — LOW (ref 135–145)
SODIUM SERPL-SCNC: 135 MMOL/L — SIGNIFICANT CHANGE UP (ref 135–145)
SODIUM SERPL-SCNC: 136 MMOL/L — SIGNIFICANT CHANGE UP (ref 135–145)
SODIUM SERPL-SCNC: 136 MMOL/L — SIGNIFICANT CHANGE UP (ref 135–145)
SODIUM SERPL-SCNC: 137 MMOL/L — SIGNIFICANT CHANGE UP (ref 135–145)
SODIUM SERPL-SCNC: 139 MMOL/L — SIGNIFICANT CHANGE UP (ref 135–145)
SODIUM SERPL-SCNC: 139 MMOL/L — SIGNIFICANT CHANGE UP (ref 135–145)
TROPONIN T SERPL-MCNC: <0.01 NG/ML — SIGNIFICANT CHANGE UP (ref 0–0.06)
TYPE + AB SCN PNL BLD: SIGNIFICANT CHANGE UP
WBC # BLD: 11.3 K/UL — HIGH (ref 4.8–10.8)
WBC # BLD: 14.2 K/UL — HIGH (ref 4.8–10.8)
WBC # BLD: 19.2 K/UL — HIGH (ref 4.8–10.8)
WBC # BLD: 6.1 K/UL — SIGNIFICANT CHANGE UP (ref 4.8–10.8)
WBC # BLD: 6.7 K/UL — SIGNIFICANT CHANGE UP (ref 4.8–10.8)
WBC # BLD: 6.8 K/UL — SIGNIFICANT CHANGE UP (ref 4.8–10.8)
WBC # BLD: 7.7 K/UL — SIGNIFICANT CHANGE UP (ref 4.8–10.8)
WBC # BLD: 8 K/UL — SIGNIFICANT CHANGE UP (ref 4.8–10.8)
WBC # BLD: 8.3 K/UL — SIGNIFICANT CHANGE UP (ref 4.8–10.8)
WBC # BLD: 8.4 K/UL — SIGNIFICANT CHANGE UP (ref 4.8–10.8)
WBC # BLD: 8.4 K/UL — SIGNIFICANT CHANGE UP (ref 4.8–10.8)
WBC # BLD: 9 K/UL — SIGNIFICANT CHANGE UP (ref 4.8–10.8)
WBC # BLD: 9.6 K/UL — SIGNIFICANT CHANGE UP (ref 4.8–10.8)
WBC # FLD AUTO: 11.3 K/UL — HIGH (ref 4.8–10.8)
WBC # FLD AUTO: 14.2 K/UL — HIGH (ref 4.8–10.8)
WBC # FLD AUTO: 19.2 K/UL — HIGH (ref 4.8–10.8)
WBC # FLD AUTO: 6.1 K/UL — SIGNIFICANT CHANGE UP (ref 4.8–10.8)
WBC # FLD AUTO: 6.7 K/UL — SIGNIFICANT CHANGE UP (ref 4.8–10.8)
WBC # FLD AUTO: 6.8 K/UL — SIGNIFICANT CHANGE UP (ref 4.8–10.8)
WBC # FLD AUTO: 7.7 K/UL — SIGNIFICANT CHANGE UP (ref 4.8–10.8)
WBC # FLD AUTO: 8 K/UL — SIGNIFICANT CHANGE UP (ref 4.8–10.8)
WBC # FLD AUTO: 8.3 K/UL — SIGNIFICANT CHANGE UP (ref 4.8–10.8)
WBC # FLD AUTO: 8.4 K/UL — SIGNIFICANT CHANGE UP (ref 4.8–10.8)
WBC # FLD AUTO: 8.4 K/UL — SIGNIFICANT CHANGE UP (ref 4.8–10.8)
WBC # FLD AUTO: 9 K/UL — SIGNIFICANT CHANGE UP (ref 4.8–10.8)
WBC # FLD AUTO: 9.6 K/UL — SIGNIFICANT CHANGE UP (ref 4.8–10.8)

## 2017-01-01 PROCEDURE — 99232 SBSQ HOSP IP/OBS MODERATE 35: CPT

## 2017-01-01 PROCEDURE — 82565 ASSAY OF CREATININE: CPT

## 2017-01-01 PROCEDURE — 99222 1ST HOSP IP/OBS MODERATE 55: CPT

## 2017-01-01 PROCEDURE — 83880 ASSAY OF NATRIURETIC PEPTIDE: CPT

## 2017-01-01 PROCEDURE — 86850 RBC ANTIBODY SCREEN: CPT

## 2017-01-01 PROCEDURE — 36415 COLL VENOUS BLD VENIPUNCTURE: CPT

## 2017-01-01 PROCEDURE — 85027 COMPLETE CBC AUTOMATED: CPT

## 2017-01-01 PROCEDURE — 85730 THROMBOPLASTIN TIME PARTIAL: CPT

## 2017-01-01 PROCEDURE — 85610 PROTHROMBIN TIME: CPT

## 2017-01-01 PROCEDURE — 99223 1ST HOSP IP/OBS HIGH 75: CPT

## 2017-01-01 PROCEDURE — 99231 SBSQ HOSP IP/OBS SF/LOW 25: CPT

## 2017-01-01 PROCEDURE — 82042 OTHER SOURCE ALBUMIN QUAN EA: CPT

## 2017-01-01 PROCEDURE — 71010: CPT | Mod: 26

## 2017-01-01 PROCEDURE — 83615 LACTATE (LD) (LDH) ENZYME: CPT

## 2017-01-01 PROCEDURE — 71260 CT THORAX DX C+: CPT

## 2017-01-01 PROCEDURE — 71020: CPT | Mod: 26

## 2017-01-01 PROCEDURE — 99497 ADVNCD CARE PLAN 30 MIN: CPT | Mod: 25

## 2017-01-01 PROCEDURE — 71045 X-RAY EXAM CHEST 1 VIEW: CPT

## 2017-01-01 PROCEDURE — 99233 SBSQ HOSP IP/OBS HIGH 50: CPT

## 2017-01-01 PROCEDURE — 71250 CT THORAX DX C-: CPT

## 2017-01-01 PROCEDURE — 94640 AIRWAY INHALATION TREATMENT: CPT

## 2017-01-01 PROCEDURE — 93306 TTE W/DOPPLER COMPLETE: CPT | Mod: 26

## 2017-01-01 PROCEDURE — 93970 EXTREMITY STUDY: CPT | Mod: 26

## 2017-01-01 PROCEDURE — 71010: CPT | Mod: 26,77

## 2017-01-01 PROCEDURE — 71010: CPT | Mod: 26,77,76

## 2017-01-01 PROCEDURE — 93005 ELECTROCARDIOGRAM TRACING: CPT

## 2017-01-01 PROCEDURE — 82945 GLUCOSE OTHER FLUID: CPT

## 2017-01-01 PROCEDURE — 84100 ASSAY OF PHOSPHORUS: CPT

## 2017-01-01 PROCEDURE — 84484 ASSAY OF TROPONIN QUANT: CPT

## 2017-01-01 PROCEDURE — 83735 ASSAY OF MAGNESIUM: CPT

## 2017-01-01 PROCEDURE — 99285 EMERGENCY DEPT VISIT HI MDM: CPT | Mod: 25

## 2017-01-01 PROCEDURE — 86901 BLOOD TYPING SEROLOGIC RH(D): CPT

## 2017-01-01 PROCEDURE — 71250 CT THORAX DX C-: CPT | Mod: 26

## 2017-01-01 PROCEDURE — 99221 1ST HOSP IP/OBS SF/LOW 40: CPT | Mod: 57

## 2017-01-01 PROCEDURE — 71275 CT ANGIOGRAPHY CHEST: CPT | Mod: 26

## 2017-01-01 PROCEDURE — 80053 COMPREHEN METABOLIC PANEL: CPT

## 2017-01-01 PROCEDURE — 93010 ELECTROCARDIOGRAM REPORT: CPT

## 2017-01-01 PROCEDURE — 83036 HEMOGLOBIN GLYCOSYLATED A1C: CPT

## 2017-01-01 PROCEDURE — 94760 N-INVAS EAR/PLS OXIMETRY 1: CPT

## 2017-01-01 PROCEDURE — 74177 CT ABD & PELVIS W/CONTRAST: CPT

## 2017-01-01 PROCEDURE — 84157 ASSAY OF PROTEIN OTHER: CPT

## 2017-01-01 PROCEDURE — 80048 BASIC METABOLIC PNL TOTAL CA: CPT

## 2017-01-01 PROCEDURE — 99285 EMERGENCY DEPT VISIT HI MDM: CPT

## 2017-01-01 PROCEDURE — 93970 EXTREMITY STUDY: CPT

## 2017-01-01 PROCEDURE — 99239 HOSP IP/OBS DSCHRG MGMT >30: CPT

## 2017-01-01 PROCEDURE — 71046 X-RAY EXAM CHEST 2 VIEWS: CPT

## 2017-01-01 PROCEDURE — 86920 COMPATIBILITY TEST SPIN: CPT

## 2017-01-01 PROCEDURE — 31622 DX BRONCHOSCOPE/WASH: CPT | Mod: GC

## 2017-01-01 PROCEDURE — 97163 PT EVAL HIGH COMPLEX 45 MIN: CPT

## 2017-01-01 PROCEDURE — 32650 THORACOSCOPY W/PLEURODESIS: CPT | Mod: RT,GC

## 2017-01-01 PROCEDURE — 86900 BLOOD TYPING SEROLOGIC ABO: CPT

## 2017-01-01 PROCEDURE — 93306 TTE W/DOPPLER COMPLETE: CPT

## 2017-01-01 PROCEDURE — 85379 FIBRIN DEGRADATION QUANT: CPT

## 2017-01-01 PROCEDURE — 71010: CPT | Mod: 26,76

## 2017-01-01 PROCEDURE — 99238 HOSP IP/OBS DSCHRG MGMT 30/<: CPT

## 2017-01-01 PROCEDURE — 71275 CT ANGIOGRAPHY CHEST: CPT

## 2017-01-01 PROCEDURE — 80162 ASSAY OF DIGOXIN TOTAL: CPT

## 2017-01-01 PROCEDURE — 83986 ASSAY PH BODY FLUID NOS: CPT

## 2017-01-01 RX ORDER — OXYCODONE HYDROCHLORIDE 5 MG/1
1 TABLET ORAL
Qty: 0 | Refills: 0 | COMMUNITY
Start: 2017-01-01

## 2017-01-01 RX ORDER — INSULIN LISPRO 100/ML
VIAL (ML) SUBCUTANEOUS
Qty: 0 | Refills: 0 | Status: DISCONTINUED | OUTPATIENT
Start: 2017-01-01 | End: 2017-01-01

## 2017-01-01 RX ORDER — NIVOLUMAB 10 MG/ML
100 INJECTION INTRAVENOUS
Refills: 0 | Status: ACTIVE | COMMUNITY

## 2017-01-01 RX ORDER — DILTIAZEM HYDROCHLORIDE 120 MG/1
120 TABLET ORAL
Qty: 90 | Refills: 0 | Status: DISCONTINUED | COMMUNITY
Start: 2017-01-01

## 2017-01-01 RX ORDER — SODIUM CHLORIDE 9 MG/ML
3 INJECTION INTRAMUSCULAR; INTRAVENOUS; SUBCUTANEOUS ONCE
Qty: 0 | Refills: 0 | Status: COMPLETED | OUTPATIENT
Start: 2017-01-01 | End: 2017-01-01

## 2017-01-01 RX ORDER — SERTRALINE 25 MG/1
1 TABLET, FILM COATED ORAL
Qty: 30 | Refills: 0 | OUTPATIENT
Start: 2017-01-01

## 2017-01-01 RX ORDER — MORPHINE SULFATE 50 MG/1
6 CAPSULE, EXTENDED RELEASE ORAL EVERY 4 HOURS
Qty: 0 | Refills: 0 | Status: DISCONTINUED | OUTPATIENT
Start: 2017-01-01 | End: 2017-01-01

## 2017-01-01 RX ORDER — OXYCODONE HYDROCHLORIDE 5 MG/1
5 TABLET ORAL EVERY 4 HOURS
Qty: 0 | Refills: 0 | Status: DISCONTINUED | OUTPATIENT
Start: 2017-01-01 | End: 2017-01-01

## 2017-01-01 RX ORDER — FUROSEMIDE 40 MG
40 TABLET ORAL EVERY 12 HOURS
Qty: 0 | Refills: 0 | Status: DISCONTINUED | OUTPATIENT
Start: 2017-01-01 | End: 2017-01-01

## 2017-01-01 RX ORDER — DEXTROSE 50 % IN WATER 50 %
12.5 SYRINGE (ML) INTRAVENOUS ONCE
Qty: 0 | Refills: 0 | Status: DISCONTINUED | OUTPATIENT
Start: 2017-01-01 | End: 2017-01-01

## 2017-01-01 RX ORDER — ACETAMINOPHEN 500 MG
650 TABLET ORAL EVERY 6 HOURS
Qty: 0 | Refills: 0 | Status: DISCONTINUED | OUTPATIENT
Start: 2017-01-01 | End: 2017-01-01

## 2017-01-01 RX ORDER — LEVALBUTEROL 1.25 MG/.5ML
0.63 SOLUTION, CONCENTRATE RESPIRATORY (INHALATION) EVERY 6 HOURS
Qty: 0 | Refills: 0 | Status: DISCONTINUED | OUTPATIENT
Start: 2017-01-01 | End: 2017-01-01

## 2017-01-01 RX ORDER — DILTIAZEM HCL 120 MG
240 CAPSULE, EXT RELEASE 24 HR ORAL DAILY
Qty: 0 | Refills: 0 | Status: DISCONTINUED | OUTPATIENT
Start: 2017-01-01 | End: 2017-01-01

## 2017-01-01 RX ORDER — DILTIAZEM HCL 120 MG
1 CAPSULE, EXT RELEASE 24 HR ORAL
Qty: 30 | Refills: 0 | OUTPATIENT
Start: 2017-01-01 | End: 2017-01-01

## 2017-01-01 RX ORDER — SENNA PLUS 8.6 MG/1
2 TABLET ORAL AT BEDTIME
Qty: 0 | Refills: 0 | Status: DISCONTINUED | OUTPATIENT
Start: 2017-01-01 | End: 2017-01-01

## 2017-01-01 RX ORDER — GLUCAGON INJECTION, SOLUTION 0.5 MG/.1ML
1 INJECTION, SOLUTION SUBCUTANEOUS ONCE
Qty: 0 | Refills: 0 | Status: DISCONTINUED | OUTPATIENT
Start: 2017-01-01 | End: 2017-01-01

## 2017-01-01 RX ORDER — OXYCODONE HYDROCHLORIDE 5 MG/1
5 TABLET ORAL EVERY 6 HOURS
Qty: 0 | Refills: 0 | Status: DISCONTINUED | OUTPATIENT
Start: 2017-01-01 | End: 2017-01-01

## 2017-01-01 RX ORDER — DILTIAZEM HCL 120 MG
360 CAPSULE, EXT RELEASE 24 HR ORAL DAILY
Qty: 0 | Refills: 0 | Status: DISCONTINUED | OUTPATIENT
Start: 2017-01-01 | End: 2017-01-01

## 2017-01-01 RX ORDER — METFORMIN HYDROCHLORIDE 850 MG/1
750 TABLET ORAL
Qty: 0 | Refills: 0 | Status: DISCONTINUED | OUTPATIENT
Start: 2017-01-01 | End: 2017-01-01

## 2017-01-01 RX ORDER — MORPHINE SULFATE 50 MG/1
3 CAPSULE, EXTENDED RELEASE ORAL
Qty: 0 | Refills: 0 | Status: DISCONTINUED | OUTPATIENT
Start: 2017-01-01 | End: 2017-01-01

## 2017-01-01 RX ORDER — ALLOPURINOL 300 MG
1 TABLET ORAL
Qty: 30 | Refills: 0 | OUTPATIENT
Start: 2017-01-01

## 2017-01-01 RX ORDER — DOCUSATE SODIUM 100 MG
100 CAPSULE ORAL THREE TIMES A DAY
Qty: 0 | Refills: 0 | Status: DISCONTINUED | OUTPATIENT
Start: 2017-01-01 | End: 2017-01-01

## 2017-01-01 RX ORDER — LIDOCAINE HCL 20 MG/ML
20 VIAL (ML) INJECTION ONCE
Qty: 0 | Refills: 0 | Status: COMPLETED | OUTPATIENT
Start: 2017-01-01 | End: 2017-01-01

## 2017-01-01 RX ORDER — DEXTROSE 50 % IN WATER 50 %
25 SYRINGE (ML) INTRAVENOUS ONCE
Qty: 0 | Refills: 0 | Status: DISCONTINUED | OUTPATIENT
Start: 2017-01-01 | End: 2017-01-01

## 2017-01-01 RX ORDER — FUROSEMIDE 40 MG
40 TABLET ORAL ONCE
Qty: 0 | Refills: 0 | Status: COMPLETED | OUTPATIENT
Start: 2017-01-01 | End: 2017-01-01

## 2017-01-01 RX ORDER — HYDROMORPHONE HYDROCHLORIDE 2 MG/ML
0.5 INJECTION INTRAMUSCULAR; INTRAVENOUS; SUBCUTANEOUS ONCE
Qty: 0 | Refills: 0 | Status: DISCONTINUED | OUTPATIENT
Start: 2017-01-01 | End: 2017-01-01

## 2017-01-01 RX ORDER — IPRATROPIUM BROMIDE 0.2 MG/ML
500 SOLUTION, NON-ORAL INHALATION EVERY 6 HOURS
Qty: 0 | Refills: 0 | Status: DISCONTINUED | OUTPATIENT
Start: 2017-01-01 | End: 2017-01-01

## 2017-01-01 RX ORDER — OXYCODONE HYDROCHLORIDE 5 MG/1
1 TABLET ORAL
Qty: 28 | Refills: 0 | OUTPATIENT
Start: 2017-01-01 | End: 2017-01-01

## 2017-01-01 RX ORDER — OXYCODONE HYDROCHLORIDE 5 MG/1
10 TABLET ORAL
Qty: 0 | Refills: 0 | Status: DISCONTINUED | OUTPATIENT
Start: 2017-01-01 | End: 2017-01-01

## 2017-01-01 RX ORDER — POTASSIUM CHLORIDE 20 MEQ
10 PACKET (EA) ORAL DAILY
Qty: 0 | Refills: 0 | Status: DISCONTINUED | OUTPATIENT
Start: 2017-01-01 | End: 2017-01-01

## 2017-01-01 RX ORDER — DEXTROSE 50 % IN WATER 50 %
1 SYRINGE (ML) INTRAVENOUS ONCE
Qty: 0 | Refills: 0 | Status: DISCONTINUED | OUTPATIENT
Start: 2017-01-01 | End: 2017-01-01

## 2017-01-01 RX ORDER — POTASSIUM CHLORIDE 20 MEQ
40 PACKET (EA) ORAL ONCE
Qty: 0 | Refills: 0 | Status: COMPLETED | OUTPATIENT
Start: 2017-01-01 | End: 2017-01-01

## 2017-01-01 RX ORDER — ZALEPLON 10 MG
5 CAPSULE ORAL AT BEDTIME
Qty: 0 | Refills: 0 | Status: DISCONTINUED | OUTPATIENT
Start: 2017-01-01 | End: 2017-01-01

## 2017-01-01 RX ORDER — LEVOFLOXACIN 500 MG/1
500 TABLET, FILM COATED ORAL
Qty: 7 | Refills: 0 | Status: COMPLETED | COMMUNITY
Start: 2017-01-01

## 2017-01-01 RX ORDER — POTASSIUM CHLORIDE 20 MEQ
20 PACKET (EA) ORAL DAILY
Qty: 0 | Refills: 0 | Status: DISCONTINUED | OUTPATIENT
Start: 2017-01-01 | End: 2017-01-01

## 2017-01-01 RX ORDER — DIGOXIN 250 MCG
0.25 TABLET ORAL DAILY
Qty: 0 | Refills: 0 | Status: DISCONTINUED | OUTPATIENT
Start: 2017-01-01 | End: 2017-01-01

## 2017-01-01 RX ORDER — DRONABINOL 2.5 MG
2.5 CAPSULE ORAL DAILY
Qty: 0 | Refills: 0 | Status: DISCONTINUED | OUTPATIENT
Start: 2017-01-01 | End: 2017-01-01

## 2017-01-01 RX ORDER — LEVALBUTEROL HYDROCHLORIDE 0.63 MG/3ML
0.63 SOLUTION RESPIRATORY (INHALATION) EVERY 6 HOURS
Qty: 360 | Refills: 0 | Status: ACTIVE | COMMUNITY
Start: 2017-01-01 | End: 1900-01-01

## 2017-01-01 RX ORDER — DOCUSATE SODIUM 100 MG
1 CAPSULE ORAL
Qty: 0 | Refills: 0 | COMMUNITY
Start: 2017-01-01

## 2017-01-01 RX ORDER — ALLOPURINOL 300 MG
100 TABLET ORAL
Qty: 0 | Refills: 0 | Status: DISCONTINUED | OUTPATIENT
Start: 2017-01-01 | End: 2017-01-01

## 2017-01-01 RX ORDER — APIXABAN 5 MG/1
5 TABLET, FILM COATED ORAL
Qty: 60 | Refills: 3 | Status: ACTIVE | COMMUNITY
Start: 2017-01-01

## 2017-01-01 RX ORDER — HEPARIN SODIUM 5000 [USP'U]/ML
5000 INJECTION INTRAVENOUS; SUBCUTANEOUS EVERY 8 HOURS
Qty: 0 | Refills: 0 | Status: DISCONTINUED | OUTPATIENT
Start: 2017-01-01 | End: 2017-01-01

## 2017-01-01 RX ORDER — ALLOPURINOL 100 MG/1
100 TABLET ORAL
Qty: 14 | Refills: 0 | Status: ACTIVE | COMMUNITY
Start: 2017-01-01

## 2017-01-01 RX ORDER — MORPHINE SULFATE 50 MG/1
5 CAPSULE, EXTENDED RELEASE ORAL EVERY 4 HOURS
Qty: 0 | Refills: 0 | Status: DISCONTINUED | OUTPATIENT
Start: 2017-01-01 | End: 2017-01-01

## 2017-01-01 RX ORDER — ENOXAPARIN SODIUM 100 MG/ML
100 INJECTION SUBCUTANEOUS EVERY 12 HOURS
Qty: 0 | Refills: 0 | Status: DISCONTINUED | OUTPATIENT
Start: 2017-01-01 | End: 2017-01-01

## 2017-01-01 RX ORDER — INDOMETHACIN 75 MG/1
75 CAPSULE, EXTENDED RELEASE ORAL
Qty: 30 | Refills: 0 | Status: COMPLETED | COMMUNITY
Start: 2017-01-01

## 2017-01-01 RX ORDER — FUROSEMIDE 40 MG
20 TABLET ORAL ONCE
Qty: 0 | Refills: 0 | Status: COMPLETED | OUTPATIENT
Start: 2017-01-01 | End: 2017-01-01

## 2017-01-01 RX ORDER — POTASSIUM CHLORIDE 20 MEQ
40 PACKET (EA) ORAL EVERY 4 HOURS
Qty: 0 | Refills: 0 | Status: COMPLETED | OUTPATIENT
Start: 2017-01-01 | End: 2017-01-01

## 2017-01-01 RX ORDER — ROSUVASTATIN CALCIUM 5 MG/1
20 TABLET ORAL AT BEDTIME
Qty: 0 | Refills: 0 | Status: DISCONTINUED | OUTPATIENT
Start: 2017-01-01 | End: 2017-01-01

## 2017-01-01 RX ORDER — FUROSEMIDE 40 MG
1 TABLET ORAL
Qty: 0 | Refills: 0 | COMMUNITY
Start: 2017-01-01

## 2017-01-01 RX ORDER — OXYCODONE HYDROCHLORIDE 5 MG/1
5 TABLET ORAL
Qty: 0 | Refills: 0 | Status: DISCONTINUED | OUTPATIENT
Start: 2017-01-01 | End: 2017-01-01

## 2017-01-01 RX ORDER — LEVALBUTEROL 1.25 MG/.5ML
0.63 SOLUTION, CONCENTRATE RESPIRATORY (INHALATION) EVERY 8 HOURS
Qty: 0 | Refills: 0 | Status: DISCONTINUED | OUTPATIENT
Start: 2017-01-01 | End: 2017-01-01

## 2017-01-01 RX ORDER — SERTRALINE 25 MG/1
50 TABLET, FILM COATED ORAL DAILY
Qty: 0 | Refills: 0 | Status: DISCONTINUED | OUTPATIENT
Start: 2017-01-01 | End: 2017-01-01

## 2017-01-01 RX ORDER — LIDOCAINE 4 G/100G
2 CREAM TOPICAL DAILY
Qty: 0 | Refills: 0 | Status: DISCONTINUED | OUTPATIENT
Start: 2017-01-01 | End: 2017-01-01

## 2017-01-01 RX ORDER — MAGNESIUM OXIDE 400 MG ORAL TABLET 241.3 MG
400 TABLET ORAL
Qty: 0 | Refills: 0 | Status: COMPLETED | OUTPATIENT
Start: 2017-01-01 | End: 2017-01-01

## 2017-01-01 RX ORDER — SODIUM CHLORIDE 9 MG/ML
1000 INJECTION, SOLUTION INTRAVENOUS
Qty: 0 | Refills: 0 | Status: DISCONTINUED | OUTPATIENT
Start: 2017-01-01 | End: 2017-01-01

## 2017-01-01 RX ORDER — IPRATROPIUM/ALBUTEROL SULFATE 18-103MCG
3 AEROSOL WITH ADAPTER (GRAM) INHALATION EVERY 6 HOURS
Qty: 0 | Refills: 0 | Status: DISCONTINUED | OUTPATIENT
Start: 2017-01-01 | End: 2017-01-01

## 2017-01-01 RX ORDER — APIXABAN 2.5 MG/1
5 TABLET, FILM COATED ORAL
Qty: 0 | Refills: 0 | Status: DISCONTINUED | OUTPATIENT
Start: 2017-01-01 | End: 2017-01-01

## 2017-01-01 RX ORDER — MORPHINE SULFATE 50 MG/1
2 CAPSULE, EXTENDED RELEASE ORAL AT BEDTIME
Qty: 0 | Refills: 0 | Status: DISCONTINUED | OUTPATIENT
Start: 2017-01-01 | End: 2017-01-01

## 2017-01-01 RX ORDER — ALLOPURINOL 100 MG/1
100 TABLET ORAL TWICE DAILY
Refills: 0 | Status: ACTIVE | COMMUNITY

## 2017-01-01 RX ORDER — INSULIN LISPRO 100/ML
VIAL (ML) SUBCUTANEOUS AT BEDTIME
Qty: 0 | Refills: 0 | Status: DISCONTINUED | OUTPATIENT
Start: 2017-01-01 | End: 2017-01-01

## 2017-01-01 RX ORDER — FUROSEMIDE 40 MG/1
40 TABLET ORAL DAILY
Qty: 30 | Refills: 3 | Status: ACTIVE | COMMUNITY
Start: 2017-01-01 | End: 1900-01-01

## 2017-01-01 RX ORDER — SENNA PLUS 8.6 MG/1
2 TABLET ORAL
Qty: 0 | Refills: 0 | COMMUNITY
Start: 2017-01-01

## 2017-01-01 RX ORDER — MAGNESIUM SULFATE 500 MG/ML
2 VIAL (ML) INJECTION ONCE
Qty: 0 | Refills: 0 | Status: COMPLETED | OUTPATIENT
Start: 2017-01-01 | End: 2017-01-01

## 2017-01-01 RX ORDER — NIVOLUMAB 10 MG/ML
0 INJECTION INTRAVENOUS
Qty: 0 | Refills: 0 | COMMUNITY

## 2017-01-01 RX ORDER — APIXABAN 2.5 MG/1
1 TABLET, FILM COATED ORAL
Qty: 0 | Refills: 0 | COMMUNITY

## 2017-01-01 RX ORDER — FUROSEMIDE 40 MG
40 TABLET ORAL DAILY
Qty: 0 | Refills: 0 | Status: DISCONTINUED | OUTPATIENT
Start: 2017-01-01 | End: 2017-01-01

## 2017-01-01 RX ORDER — ROSUVASTATIN CALCIUM 5 MG/1
1 TABLET ORAL
Qty: 0 | Refills: 0 | COMMUNITY

## 2017-01-01 RX ORDER — POTASSIUM CHLORIDE 20 MEQ
1 PACKET (EA) ORAL
Qty: 30 | Refills: 0 | OUTPATIENT
Start: 2017-01-01 | End: 2017-01-01

## 2017-01-01 RX ORDER — HYDROMORPHONE HYDROCHLORIDE 2 MG/ML
0.5 INJECTION INTRAMUSCULAR; INTRAVENOUS; SUBCUTANEOUS EVERY 4 HOURS
Qty: 0 | Refills: 0 | Status: DISCONTINUED | OUTPATIENT
Start: 2017-01-01 | End: 2017-01-01

## 2017-01-01 RX ORDER — NIVOLUMAB 10 MG/ML
240 INJECTION INTRAVENOUS
Qty: 0 | Refills: 0 | COMMUNITY

## 2017-01-01 RX ORDER — SORBITOL SOLUTION 70 %
30 SOLUTION, ORAL MISCELLANEOUS ONCE
Qty: 0 | Refills: 0 | Status: COMPLETED | OUTPATIENT
Start: 2017-01-01 | End: 2017-01-01

## 2017-01-01 RX ORDER — ACETAMINOPHEN 500 MG
1000 TABLET ORAL ONCE
Qty: 0 | Refills: 0 | Status: COMPLETED | OUTPATIENT
Start: 2017-01-01 | End: 2017-01-01

## 2017-01-01 RX ORDER — SODIUM CHLORIDE 9 MG/ML
3 INJECTION INTRAMUSCULAR; INTRAVENOUS; SUBCUTANEOUS EVERY 8 HOURS
Qty: 0 | Refills: 0 | Status: DISCONTINUED | OUTPATIENT
Start: 2017-01-01 | End: 2017-01-01

## 2017-01-01 RX ORDER — SODIUM CHLORIDE 9 MG/ML
500 INJECTION, SOLUTION INTRAVENOUS
Qty: 0 | Refills: 0 | Status: DISCONTINUED | OUTPATIENT
Start: 2017-01-01 | End: 2017-01-01

## 2017-01-01 RX ORDER — POTASSIUM CHLORIDE 20 MEQ
0 PACKET (EA) ORAL
Qty: 7 | Refills: 0 | COMMUNITY

## 2017-01-01 RX ORDER — FUROSEMIDE 40 MG
1 TABLET ORAL
Qty: 30 | Refills: 0 | OUTPATIENT
Start: 2017-01-01 | End: 2017-01-01

## 2017-01-01 RX ORDER — DILTIAZEM HCL 120 MG
120 CAPSULE, EXT RELEASE 24 HR ORAL DAILY
Qty: 0 | Refills: 0 | Status: DISCONTINUED | OUTPATIENT
Start: 2017-01-01 | End: 2017-01-01

## 2017-01-01 RX ORDER — GLUCOSAMINE/MSM/CHONDROIT SULF 500-166.6
TABLET ORAL
Refills: 0 | Status: ACTIVE | COMMUNITY

## 2017-01-01 RX ORDER — FUROSEMIDE 20 MG/1
20 TABLET ORAL
Qty: 30 | Refills: 0 | Status: ACTIVE | COMMUNITY
Start: 2017-01-01

## 2017-01-01 RX ORDER — METHYLPREDNISOLONE 4 MG/1
4 TABLET ORAL
Qty: 21 | Refills: 0 | Status: COMPLETED | COMMUNITY
Start: 2017-01-01

## 2017-01-01 RX ORDER — MORPHINE SULFATE 50 MG/1
3 CAPSULE, EXTENDED RELEASE ORAL EVERY 4 HOURS
Qty: 0 | Refills: 0 | Status: DISCONTINUED | OUTPATIENT
Start: 2017-01-01 | End: 2017-01-01

## 2017-01-01 RX ADMIN — OXYCODONE HYDROCHLORIDE 5 MILLIGRAM(S): 5 TABLET ORAL at 02:00

## 2017-01-01 RX ADMIN — LIDOCAINE 2 PATCH: 4 CREAM TOPICAL at 21:33

## 2017-01-01 RX ADMIN — SODIUM CHLORIDE 30 MILLILITER(S): 9 INJECTION, SOLUTION INTRAVENOUS at 18:05

## 2017-01-01 RX ADMIN — Medication 2: at 12:45

## 2017-01-01 RX ADMIN — Medication 1: at 08:35

## 2017-01-01 RX ADMIN — HYDROMORPHONE HYDROCHLORIDE 0.5 MILLIGRAM(S): 2 INJECTION INTRAMUSCULAR; INTRAVENOUS; SUBCUTANEOUS at 09:24

## 2017-01-01 RX ADMIN — Medication 20 MILLIEQUIVALENT(S): at 22:23

## 2017-01-01 RX ADMIN — Medication 50 GRAM(S): at 22:18

## 2017-01-01 RX ADMIN — Medication 360 MILLIGRAM(S): at 05:13

## 2017-01-01 RX ADMIN — LEVALBUTEROL 0.63 MILLIGRAM(S): 1.25 SOLUTION, CONCENTRATE RESPIRATORY (INHALATION) at 09:09

## 2017-01-01 RX ADMIN — OXYCODONE HYDROCHLORIDE 5 MILLIGRAM(S): 5 TABLET ORAL at 23:30

## 2017-01-01 RX ADMIN — Medication 0.25 MILLIGRAM(S): at 12:34

## 2017-01-01 RX ADMIN — LIDOCAINE 2 PATCH: 4 CREAM TOPICAL at 10:41

## 2017-01-01 RX ADMIN — SODIUM CHLORIDE 3 MILLILITER(S): 9 INJECTION INTRAMUSCULAR; INTRAVENOUS; SUBCUTANEOUS at 05:19

## 2017-01-01 RX ADMIN — Medication 40 MILLIGRAM(S): at 12:34

## 2017-01-01 RX ADMIN — Medication 1 TABLET(S): at 12:46

## 2017-01-01 RX ADMIN — OXYCODONE HYDROCHLORIDE 5 MILLIGRAM(S): 5 TABLET ORAL at 22:55

## 2017-01-01 RX ADMIN — OXYCODONE HYDROCHLORIDE 5 MILLIGRAM(S): 5 TABLET ORAL at 22:10

## 2017-01-01 RX ADMIN — Medication 0.25 MILLIGRAM(S): at 06:28

## 2017-01-01 RX ADMIN — Medication 0.25 MILLIGRAM(S): at 05:13

## 2017-01-01 RX ADMIN — Medication 20 MILLIEQUIVALENT(S): at 11:14

## 2017-01-01 RX ADMIN — Medication 0.25 MILLIGRAM(S): at 05:29

## 2017-01-01 RX ADMIN — Medication 40 MILLIGRAM(S): at 06:58

## 2017-01-01 RX ADMIN — Medication 240 MILLIGRAM(S): at 12:46

## 2017-01-01 RX ADMIN — Medication 40 MILLIEQUIVALENT(S): at 17:22

## 2017-01-01 RX ADMIN — Medication 100 MILLIGRAM(S): at 08:58

## 2017-01-01 RX ADMIN — MORPHINE SULFATE 2 MILLIGRAM(S): 50 CAPSULE, EXTENDED RELEASE ORAL at 23:30

## 2017-01-01 RX ADMIN — Medication 100 MILLIGRAM(S): at 06:42

## 2017-01-01 RX ADMIN — SERTRALINE 50 MILLIGRAM(S): 25 TABLET, FILM COATED ORAL at 12:52

## 2017-01-01 RX ADMIN — Medication 1 TABLET(S): at 12:31

## 2017-01-01 RX ADMIN — SODIUM CHLORIDE 3 MILLILITER(S): 9 INJECTION INTRAMUSCULAR; INTRAVENOUS; SUBCUTANEOUS at 23:15

## 2017-01-01 RX ADMIN — Medication 40 MILLIGRAM(S): at 06:42

## 2017-01-01 RX ADMIN — OXYCODONE HYDROCHLORIDE 5 MILLIGRAM(S): 5 TABLET ORAL at 22:31

## 2017-01-01 RX ADMIN — LEVALBUTEROL 0.63 MILLIGRAM(S): 1.25 SOLUTION, CONCENTRATE RESPIRATORY (INHALATION) at 09:11

## 2017-01-01 RX ADMIN — Medication 100 MILLIGRAM(S): at 23:15

## 2017-01-01 RX ADMIN — Medication 2: at 12:30

## 2017-01-01 RX ADMIN — SERTRALINE 50 MILLIGRAM(S): 25 TABLET, FILM COATED ORAL at 11:14

## 2017-01-01 RX ADMIN — LEVALBUTEROL 0.63 MILLIGRAM(S): 1.25 SOLUTION, CONCENTRATE RESPIRATORY (INHALATION) at 15:54

## 2017-01-01 RX ADMIN — Medication 2: at 09:01

## 2017-01-01 RX ADMIN — Medication 40 MILLIGRAM(S): at 05:24

## 2017-01-01 RX ADMIN — Medication 100 MILLIGRAM(S): at 05:31

## 2017-01-01 RX ADMIN — Medication 100 MILLIGRAM(S): at 09:08

## 2017-01-01 RX ADMIN — ENOXAPARIN SODIUM 100 MILLIGRAM(S): 100 INJECTION SUBCUTANEOUS at 21:56

## 2017-01-01 RX ADMIN — Medication 0.25 MILLIGRAM(S): at 06:43

## 2017-01-01 RX ADMIN — SODIUM CHLORIDE 3 MILLILITER(S): 9 INJECTION INTRAMUSCULAR; INTRAVENOUS; SUBCUTANEOUS at 22:20

## 2017-01-01 RX ADMIN — MAGNESIUM OXIDE 400 MG ORAL TABLET 400 MILLIGRAM(S): 241.3 TABLET ORAL at 18:16

## 2017-01-01 RX ADMIN — Medication 100 MILLIGRAM(S): at 21:35

## 2017-01-01 RX ADMIN — Medication 1: at 13:45

## 2017-01-01 RX ADMIN — Medication 10 MILLIEQUIVALENT(S): at 12:31

## 2017-01-01 RX ADMIN — SODIUM CHLORIDE 3 MILLILITER(S): 9 INJECTION INTRAMUSCULAR; INTRAVENOUS; SUBCUTANEOUS at 05:03

## 2017-01-01 RX ADMIN — LEVALBUTEROL 0.63 MILLIGRAM(S): 1.25 SOLUTION, CONCENTRATE RESPIRATORY (INHALATION) at 15:36

## 2017-01-01 RX ADMIN — Medication 4: at 08:58

## 2017-01-01 RX ADMIN — MORPHINE SULFATE 2 MILLIGRAM(S): 50 CAPSULE, EXTENDED RELEASE ORAL at 23:01

## 2017-01-01 RX ADMIN — MAGNESIUM OXIDE 400 MG ORAL TABLET 400 MILLIGRAM(S): 241.3 TABLET ORAL at 09:08

## 2017-01-01 RX ADMIN — Medication 10 MILLIEQUIVALENT(S): at 09:25

## 2017-01-01 RX ADMIN — Medication 4: at 12:51

## 2017-01-01 RX ADMIN — LEVALBUTEROL 0.63 MILLIGRAM(S): 1.25 SOLUTION, CONCENTRATE RESPIRATORY (INHALATION) at 00:50

## 2017-01-01 RX ADMIN — Medication 10 MILLIEQUIVALENT(S): at 12:34

## 2017-01-01 RX ADMIN — Medication 2: at 09:23

## 2017-01-01 RX ADMIN — HYDROMORPHONE HYDROCHLORIDE 0.5 MILLIGRAM(S): 2 INJECTION INTRAMUSCULAR; INTRAVENOUS; SUBCUTANEOUS at 10:05

## 2017-01-01 RX ADMIN — LEVALBUTEROL 0.63 MILLIGRAM(S): 1.25 SOLUTION, CONCENTRATE RESPIRATORY (INHALATION) at 03:08

## 2017-01-01 RX ADMIN — ENOXAPARIN SODIUM 100 MILLIGRAM(S): 100 INJECTION SUBCUTANEOUS at 12:31

## 2017-01-01 RX ADMIN — SERTRALINE 50 MILLIGRAM(S): 25 TABLET, FILM COATED ORAL at 11:49

## 2017-01-01 RX ADMIN — Medication 5 MILLIGRAM(S): at 23:26

## 2017-01-01 RX ADMIN — SODIUM CHLORIDE 3 MILLILITER(S): 9 INJECTION INTRAMUSCULAR; INTRAVENOUS; SUBCUTANEOUS at 16:45

## 2017-01-01 RX ADMIN — Medication 360 MILLIGRAM(S): at 06:29

## 2017-01-01 RX ADMIN — Medication 40 MILLIGRAM(S): at 18:43

## 2017-01-01 RX ADMIN — Medication 20 MILLIGRAM(S): at 12:28

## 2017-01-01 RX ADMIN — LEVALBUTEROL 0.63 MILLIGRAM(S): 1.25 SOLUTION, CONCENTRATE RESPIRATORY (INHALATION) at 15:00

## 2017-01-01 RX ADMIN — Medication 40 MILLIGRAM(S): at 09:53

## 2017-01-01 RX ADMIN — Medication 100 MILLIGRAM(S): at 12:52

## 2017-01-01 RX ADMIN — Medication 20 MILLILITER(S): at 14:40

## 2017-01-01 RX ADMIN — SODIUM CHLORIDE 30 MILLILITER(S): 9 INJECTION, SOLUTION INTRAVENOUS at 12:14

## 2017-01-01 RX ADMIN — APIXABAN 5 MILLIGRAM(S): 2.5 TABLET, FILM COATED ORAL at 17:22

## 2017-01-01 RX ADMIN — LEVALBUTEROL 0.63 MILLIGRAM(S): 1.25 SOLUTION, CONCENTRATE RESPIRATORY (INHALATION) at 21:19

## 2017-01-01 RX ADMIN — Medication 2: at 17:39

## 2017-01-01 RX ADMIN — LEVALBUTEROL 0.63 MILLIGRAM(S): 1.25 SOLUTION, CONCENTRATE RESPIRATORY (INHALATION) at 08:59

## 2017-01-01 RX ADMIN — LEVALBUTEROL 0.63 MILLIGRAM(S): 1.25 SOLUTION, CONCENTRATE RESPIRATORY (INHALATION) at 00:29

## 2017-01-01 RX ADMIN — Medication 10 MILLIEQUIVALENT(S): at 11:25

## 2017-01-01 RX ADMIN — ENOXAPARIN SODIUM 100 MILLIGRAM(S): 100 INJECTION SUBCUTANEOUS at 12:39

## 2017-01-01 RX ADMIN — APIXABAN 5 MILLIGRAM(S): 2.5 TABLET, FILM COATED ORAL at 06:42

## 2017-01-01 RX ADMIN — Medication 10 MILLIEQUIVALENT(S): at 12:39

## 2017-01-01 RX ADMIN — Medication 1 TABLET(S): at 12:33

## 2017-01-01 RX ADMIN — Medication 360 MILLIGRAM(S): at 12:33

## 2017-01-01 RX ADMIN — APIXABAN 5 MILLIGRAM(S): 2.5 TABLET, FILM COATED ORAL at 17:15

## 2017-01-01 RX ADMIN — SODIUM CHLORIDE 3 MILLILITER(S): 9 INJECTION INTRAMUSCULAR; INTRAVENOUS; SUBCUTANEOUS at 13:48

## 2017-01-01 RX ADMIN — SODIUM CHLORIDE 3 MILLILITER(S): 9 INJECTION INTRAMUSCULAR; INTRAVENOUS; SUBCUTANEOUS at 10:22

## 2017-01-01 RX ADMIN — SODIUM CHLORIDE 3 MILLILITER(S): 9 INJECTION INTRAMUSCULAR; INTRAVENOUS; SUBCUTANEOUS at 21:12

## 2017-01-01 RX ADMIN — OXYCODONE HYDROCHLORIDE 5 MILLIGRAM(S): 5 TABLET ORAL at 21:35

## 2017-01-01 RX ADMIN — HEPARIN SODIUM 5000 UNIT(S): 5000 INJECTION INTRAVENOUS; SUBCUTANEOUS at 13:02

## 2017-01-01 RX ADMIN — Medication 40 MILLIGRAM(S): at 05:13

## 2017-01-01 RX ADMIN — Medication 360 MILLIGRAM(S): at 07:11

## 2017-01-01 RX ADMIN — Medication 360 MILLIGRAM(S): at 06:58

## 2017-01-01 RX ADMIN — Medication 30 MILLILITER(S): at 09:34

## 2017-01-01 RX ADMIN — Medication 100 MILLIGRAM(S): at 22:29

## 2017-01-01 RX ADMIN — ENOXAPARIN SODIUM 100 MILLIGRAM(S): 100 INJECTION SUBCUTANEOUS at 21:35

## 2017-01-01 RX ADMIN — Medication 10 MILLIEQUIVALENT(S): at 11:26

## 2017-01-01 RX ADMIN — Medication 360 MILLIGRAM(S): at 05:24

## 2017-01-01 RX ADMIN — Medication 100 MILLIGRAM(S): at 09:53

## 2017-01-01 RX ADMIN — Medication 0.25 MILLIGRAM(S): at 05:24

## 2017-01-01 RX ADMIN — Medication 10 MILLIEQUIVALENT(S): at 13:01

## 2017-01-01 RX ADMIN — ENOXAPARIN SODIUM 100 MILLIGRAM(S): 100 INJECTION SUBCUTANEOUS at 10:41

## 2017-01-01 RX ADMIN — Medication 100 MILLIGRAM(S): at 13:45

## 2017-01-01 RX ADMIN — ROSUVASTATIN CALCIUM 20 MILLIGRAM(S): 5 TABLET ORAL at 22:23

## 2017-01-01 RX ADMIN — Medication 400 MILLIGRAM(S): at 19:57

## 2017-01-01 RX ADMIN — SODIUM CHLORIDE 3 MILLILITER(S): 9 INJECTION INTRAMUSCULAR; INTRAVENOUS; SUBCUTANEOUS at 05:17

## 2017-01-01 RX ADMIN — Medication 650 MILLIGRAM(S): at 14:32

## 2017-01-01 RX ADMIN — Medication 360 MILLIGRAM(S): at 05:29

## 2017-01-01 RX ADMIN — Medication 650 MILLIGRAM(S): at 15:41

## 2017-01-01 RX ADMIN — Medication 0.25 MILLIGRAM(S): at 05:51

## 2017-01-01 RX ADMIN — Medication 100 MILLIGRAM(S): at 05:13

## 2017-01-01 RX ADMIN — LIDOCAINE 2 PATCH: 4 CREAM TOPICAL at 16:03

## 2017-01-01 RX ADMIN — Medication 1: at 17:16

## 2017-01-01 RX ADMIN — LEVALBUTEROL 0.63 MILLIGRAM(S): 1.25 SOLUTION, CONCENTRATE RESPIRATORY (INHALATION) at 09:45

## 2017-01-01 RX ADMIN — ENOXAPARIN SODIUM 100 MILLIGRAM(S): 100 INJECTION SUBCUTANEOUS at 23:00

## 2017-01-01 RX ADMIN — Medication 650 MILLIGRAM(S): at 17:13

## 2017-01-01 RX ADMIN — Medication: at 15:50

## 2017-01-01 RX ADMIN — Medication 100 MILLIGRAM(S): at 22:23

## 2017-01-01 RX ADMIN — Medication 1000 MILLIGRAM(S): at 20:10

## 2017-01-01 RX ADMIN — OXYCODONE HYDROCHLORIDE 5 MILLIGRAM(S): 5 TABLET ORAL at 23:04

## 2017-01-01 RX ADMIN — Medication 0.25 MILLIGRAM(S): at 05:31

## 2017-01-01 RX ADMIN — Medication 100 MILLIGRAM(S): at 14:22

## 2017-01-01 RX ADMIN — Medication 360 MILLIGRAM(S): at 06:43

## 2017-01-01 RX ADMIN — Medication 2: at 12:34

## 2017-01-01 RX ADMIN — Medication 650 MILLIGRAM(S): at 12:31

## 2017-01-01 RX ADMIN — Medication 40 MILLIEQUIVALENT(S): at 11:49

## 2017-01-01 RX ADMIN — Medication 40 MILLIGRAM(S): at 21:33

## 2017-01-01 RX ADMIN — MORPHINE SULFATE 3 MILLIGRAM(S): 50 CAPSULE, EXTENDED RELEASE ORAL at 12:20

## 2017-01-01 RX ADMIN — Medication 40 MILLIGRAM(S): at 09:07

## 2017-01-01 RX ADMIN — Medication 2.5 MILLIGRAM(S): at 11:25

## 2017-01-01 RX ADMIN — Medication 1: at 18:04

## 2017-01-01 RX ADMIN — Medication 2: at 12:14

## 2017-01-01 RX ADMIN — Medication 2.5 MILLIGRAM(S): at 11:49

## 2017-01-01 RX ADMIN — SODIUM CHLORIDE 30 MILLILITER(S): 9 INJECTION, SOLUTION INTRAVENOUS at 05:13

## 2017-01-01 RX ADMIN — Medication 100 MILLIGRAM(S): at 05:24

## 2017-01-01 RX ADMIN — APIXABAN 5 MILLIGRAM(S): 2.5 TABLET, FILM COATED ORAL at 17:52

## 2017-01-01 RX ADMIN — LIDOCAINE 2 PATCH: 4 CREAM TOPICAL at 04:00

## 2017-01-01 RX ADMIN — Medication 40 MILLIGRAM(S): at 08:58

## 2017-01-01 RX ADMIN — Medication 40 MILLIGRAM(S): at 05:51

## 2017-01-01 RX ADMIN — LEVALBUTEROL 0.63 MILLIGRAM(S): 1.25 SOLUTION, CONCENTRATE RESPIRATORY (INHALATION) at 22:10

## 2017-01-01 RX ADMIN — Medication 10 MILLIEQUIVALENT(S): at 18:05

## 2017-01-01 RX ADMIN — Medication 650 MILLIGRAM(S): at 12:34

## 2017-01-01 RX ADMIN — LEVALBUTEROL 0.63 MILLIGRAM(S): 1.25 SOLUTION, CONCENTRATE RESPIRATORY (INHALATION) at 01:00

## 2017-01-01 RX ADMIN — Medication 100 MILLIGRAM(S): at 13:48

## 2017-01-01 RX ADMIN — LEVALBUTEROL 0.63 MILLIGRAM(S): 1.25 SOLUTION, CONCENTRATE RESPIRATORY (INHALATION) at 16:01

## 2017-01-01 RX ADMIN — Medication 650 MILLIGRAM(S): at 06:00

## 2017-01-01 RX ADMIN — SENNA PLUS 2 TABLET(S): 8.6 TABLET ORAL at 21:35

## 2017-01-01 RX ADMIN — Medication 100 MILLIGRAM(S): at 13:01

## 2017-01-01 RX ADMIN — Medication 0.25 MILLIGRAM(S): at 06:58

## 2017-01-01 RX ADMIN — ROSUVASTATIN CALCIUM 20 MILLIGRAM(S): 5 TABLET ORAL at 23:15

## 2017-01-01 RX ADMIN — APIXABAN 5 MILLIGRAM(S): 2.5 TABLET, FILM COATED ORAL at 18:15

## 2017-01-01 RX ADMIN — OXYCODONE HYDROCHLORIDE 5 MILLIGRAM(S): 5 TABLET ORAL at 01:07

## 2017-01-01 RX ADMIN — ROSUVASTATIN CALCIUM 20 MILLIGRAM(S): 5 TABLET ORAL at 21:33

## 2017-01-01 RX ADMIN — Medication 3 MILLILITER(S): at 11:50

## 2017-01-01 RX ADMIN — Medication 50 GRAM(S): at 09:53

## 2017-01-01 RX ADMIN — LEVALBUTEROL 0.63 MILLIGRAM(S): 1.25 SOLUTION, CONCENTRATE RESPIRATORY (INHALATION) at 09:10

## 2017-01-01 RX ADMIN — HEPARIN SODIUM 5000 UNIT(S): 5000 INJECTION INTRAVENOUS; SUBCUTANEOUS at 05:13

## 2017-01-01 RX ADMIN — Medication 40 MILLIGRAM(S): at 12:47

## 2017-01-01 RX ADMIN — Medication 40 MILLIGRAM(S): at 05:38

## 2017-01-01 RX ADMIN — LEVALBUTEROL 0.63 MILLIGRAM(S): 1.25 SOLUTION, CONCENTRATE RESPIRATORY (INHALATION) at 20:50

## 2017-01-01 RX ADMIN — Medication 100 MILLIGRAM(S): at 21:06

## 2017-01-01 RX ADMIN — LEVALBUTEROL 0.63 MILLIGRAM(S): 1.25 SOLUTION, CONCENTRATE RESPIRATORY (INHALATION) at 04:19

## 2017-01-01 RX ADMIN — MORPHINE SULFATE 3 MILLIGRAM(S): 50 CAPSULE, EXTENDED RELEASE ORAL at 12:24

## 2017-01-01 RX ADMIN — Medication 120 MILLIGRAM(S): at 05:51

## 2017-01-01 RX ADMIN — Medication 20 MILLIEQUIVALENT(S): at 12:52

## 2017-01-01 RX ADMIN — HEPARIN SODIUM 5000 UNIT(S): 5000 INJECTION INTRAVENOUS; SUBCUTANEOUS at 21:06

## 2017-01-01 RX ADMIN — Medication 650 MILLIGRAM(S): at 05:29

## 2017-01-01 RX ADMIN — Medication 40 MILLIGRAM(S): at 22:22

## 2017-01-01 RX ADMIN — Medication 40 MILLIEQUIVALENT(S): at 23:15

## 2017-01-01 RX ADMIN — Medication 100 MILLIGRAM(S): at 21:33

## 2017-01-01 RX ADMIN — Medication 100 MILLIGRAM(S): at 18:16

## 2017-01-01 RX ADMIN — Medication 40 MILLIGRAM(S): at 06:29

## 2017-01-01 RX ADMIN — MORPHINE SULFATE 5 MILLIGRAM(S): 50 CAPSULE, EXTENDED RELEASE ORAL at 14:41

## 2017-01-01 RX ADMIN — APIXABAN 5 MILLIGRAM(S): 2.5 TABLET, FILM COATED ORAL at 05:24

## 2017-01-01 RX ADMIN — APIXABAN 5 MILLIGRAM(S): 2.5 TABLET, FILM COATED ORAL at 05:13

## 2017-01-01 RX ADMIN — Medication 2: at 13:01

## 2017-01-01 RX ADMIN — SODIUM CHLORIDE 3 MILLILITER(S): 9 INJECTION INTRAMUSCULAR; INTRAVENOUS; SUBCUTANEOUS at 13:12

## 2017-01-01 RX ADMIN — LEVALBUTEROL 0.63 MILLIGRAM(S): 1.25 SOLUTION, CONCENTRATE RESPIRATORY (INHALATION) at 09:54

## 2017-01-01 RX ADMIN — APIXABAN 5 MILLIGRAM(S): 2.5 TABLET, FILM COATED ORAL at 05:23

## 2017-01-01 RX ADMIN — LEVALBUTEROL 0.63 MILLIGRAM(S): 1.25 SOLUTION, CONCENTRATE RESPIRATORY (INHALATION) at 16:08

## 2017-05-06 NOTE — ED PROVIDER NOTE - PROGRESS NOTE DETAILS
thoracic in ed and rec admit to medicine and they will tx effusion Dr Epstein called for cardiology as pt  sees Dr Conde case discussed and he will see for cardiology Hospitalist Dr Knott called for admission

## 2017-05-06 NOTE — ED ADULT NURSE NOTE - PMH
Diabetes    HTN (hypertension)    Hypercholesteremia Diabetes    HTN (hypertension)    Hypercholesteremia    Pericardial effusion Atrial fibrillation    Diabetes    HTN (hypertension)    Hypercholesteremia    Pericardial effusion

## 2017-05-06 NOTE — H&P ADULT - PROBLEM SELECTOR PLAN 1
CT of the chest to be reviewed once available. Supplemental oxygen. Cardiothoracic Surgery consultation noted with plans for chest tube placement. Eliquis to be held for now. CT of the chest to be reviewed once available. Supplemental oxygen. Cardiothoracic Surgery consultation noted with plans for chest tube placement. Eliquis to be held for now. Echocardiogram pending.

## 2017-05-06 NOTE — ED ADULT NURSE REASSESSMENT NOTE - NS ED NURSE REASSESS COMMENT FT1
Pt comfortable.  Cardiac monitor showing a fib with controlled rate. tolerating food and fluids.  Awaiting radiology results.  Family and patient aware of plan of care.

## 2017-05-06 NOTE — H&P ADULT - PROBLEM SELECTOR PLAN 3
Insulin coverage, close monitoring of blood glucose levels. Metformin to be discontinued on admission.

## 2017-05-06 NOTE — CHART NOTE - NSCHARTNOTEFT_GEN_A_CORE
Thoracic Surgery     Pt seen at bedside.  Pt was diagnosed with stage 4, unresectable  lung  cancer last July, finished Chemotherapy in April now currently taking octivo as per his oncologist. Pt has h/o Afib, diagnosed in July last year and was recently started on Eliquis BID as per Dr Van. Pt also underwent a pericardial drainage A few months ago.   Today, Pt p/w SOB x 7 days progressively getting worse  the last few days which brought him to the ED.  Its noted on CXR that he has b/l plural effusions.      PLAN:  Admit to hospitalist service   D/C Eliquis  CT Chest tonight  ECHO in am  Chest tube placement in am  DW Dr Myers plan and agrees

## 2017-05-06 NOTE — ED ADULT NURSE REASSESSMENT NOTE - NS ED NURSE REASSESS COMMENT FT1
Pt comfortable and in no distress.  vital signs stable.  Tolerating nasal cannula.  Transported to CT for testing.

## 2017-05-06 NOTE — ED ADULT TRIAGE NOTE - CHIEF COMPLAINT QUOTE
Patient arrived to ED today with c/o shortness of breath. Patient arrived to ED today with c/o shortness of breath.  Patient is on Octivo for lung CA.

## 2017-05-06 NOTE — ED PROVIDER NOTE - OBJECTIVE STATEMENT
66 y/o male with a h/o lung cancer and he had a left pleural effusion drain and chest tube in July and he says he has been doing well and going to the gym and then over the past  several days he has noted progressive sob while walking and no fever or chills

## 2017-05-06 NOTE — ED ADULT NURSE REASSESSMENT NOTE - NS ED NURSE REASSESS COMMENT FT1
EKG strip printed, interpreted as a fib with controlled rate, and given to hewitt clerk to scan into chart.

## 2017-05-06 NOTE — H&P ADULT - HISTORY OF PRESENT ILLNESS
67M presented with dyspnea. The patient reported that the symptoms first started about two weeks prior but he was still able to go to the gym for exercise. The symptoms worsened over the past week and the patient developed significant dyspnea on exertion. He also reported lower extremity edema and orthopnea. He denied any fevers, chills, or sick contacts. Prior records were obtained and reviewed. The patient was noted to have a prior admission last July for pericardial effusion requiring pericardiocentesis. The patient reported that the symptoms on the prior admission were not the same as this occurrence. He denied any associated palpitations or chest pain. He is unaware of any relieving factors.  On his prior visit to his cardiologist, the patient was started on Eliquis for anticoagulation.

## 2017-05-06 NOTE — ED PROVIDER NOTE - CARE PLAN
Principal Discharge DX:	Pleural effusion  Secondary Diagnosis:	SOB (shortness of breath)  Secondary Diagnosis:	Lung cancer

## 2017-05-06 NOTE — ED ADULT NURSE NOTE - OBJECTIVE STATEMENT
Assumed patient care at 1600.  Pt reports one-two week history of lower left extremety swelling.  Denies pain.  Bilateral nonpitting lower extremety edema present left greater than right. Developed shortness of breath approximately 1-2 days ago.  Diminished right lower and middle lobe lung sounds.  otherwise clear.  No acute respiratory distress present.  hx of pericardial effusion with drainage.  currently being treated for right lung cancer.

## 2017-05-07 NOTE — PROGRESS NOTE ADULT - SUBJECTIVE AND OBJECTIVE BOX
Surgeon:  Louis    Consult requesting by: Chas    HISTORY OF PRESENT ILLNESS:  67M presented with dyspnea. The patient reported that the symptoms first started about two weeks prior but he was still able to go to the gym for exercise. The symptoms worsened over the past week and the patient developed significant dyspnea on exertion. He also reported lower extremity edema and orthopnea. He denied any fevers, chills, or sick contacts. Prior records were obtained and reviewed. The patient was noted to have a prior admission last July for pericardial effusion requiring pericardiocentesis. The patient reported that the symptoms on the prior admission were not the same as this occurrence. He denied any associated palpitations or chest pain. He is unaware of any relieving factors.  On his prior visit to his cardiologist, the patient was started on Eliquis for anticoagulation.  Noted to have b/l plural effusions on CXR.  Called to consult for drainage of effusions     PAST MEDICAL & SURGICAL HISTORY:  Atrial fibrillation  Pericardial effusion  HTN (hypertension)  Hypercholesteremia  Diabetes  Right plural mass ( dx in July 2016)  No significant past surgical history      MEDICATIONS  (STANDING):  digoxin     Tablet 0.25milliGRAM(s) Oral daily  potassium chloride    Tablet ER 10milliEquivalent(s) Oral daily  insulin lispro (HumaLOG) corrective regimen sliding scale  SubCutaneous three times a day before meals  dextrose 5%. 1000milliLiter(s) IV Continuous <Continuous>  dextrose 50% Injectable 12.5Gram(s) IV Push once  dextrose 50% Injectable 25Gram(s) IV Push once  dextrose 50% Injectable 25Gram(s) IV Push once  furosemide   Injectable 40milliGRAM(s) IV Push daily  multivitamin 1Tablet(s) Oral daily  enoxaparin Injectable 100milliGRAM(s) SubCutaneous every 12 hours    MEDICATIONS  (PRN):  dextrose Gel 1Dose(s) Oral once PRN Blood Glucose LESS THAN 70 milliGRAM(s)/deciliter  glucagon  Injectable 1milliGRAM(s) IntraMuscular once PRN Glucose LESS THAN 70 milligrams/deciliter  levalbuterol Inhalation 0.63milliGRAM(s) Inhalation every 6 hours PRN sob and wheezing  ipratropium    for Nebulization 500MICROGram(s) Inhalation every 6 hours PRN Shortness of Breath and/or Wheezing  oxyCODONE  5 mG/acetaminophen 325 mG 1Tablet(s) Oral every 4 hours PRN pain    Antiplatelet therapy:  Effient                         Last dose/amt: 5/6    Allergies    No Known Allergies    Intolerances    atorvastatin (Joint Pain; Muscle Pain)      SOCIAL HISTORY:  Smoker: [ ] Yes  [x ] No        PACK YEARS:    38                     WHEN QUIT? 2 years ago   ETOH use: [ ] Yes  [x ] No              FREQUENCY / QUANTITY:  Ilicit Drug use:  [ ] Yes  [x ] No  Occupation: retired   Live with: wife   Assisted device use: no    FAMILY HISTORY:  No pertinent family history in first degree relatives      Review of Systems  CONSTITUTIONAL:  Fevers[ ] chills[ ] sweats[ ] fatigue[ ] weight loss[ ] weight gain [ ]                                     NEGATIVE [x ]   NEURO:  parathesias[ ] seizures [ ]  syncope [ ]  confusion [ ]                                                                                NEGATIVE[x ]   EYES: glasses[ ]  blurry vision[ ]  discharge[ ] pain[ ] glaucoma [ ]                                                                          NEGATIVE[x ]   ENMT:  difficulty hearing [ ]  vertigo[ ]  dysphagia[ ] epistaxis[ ] recent dental work [ ]                                    NEGATIVE[x ]   CV:  chest pain[ ] palpitations[ ] ANDRES [x ] diaphoresis [ ]                                                                                           NEGATIVE[ ]   RESPIRATORY:  wheezing[ ] SOB[ x] cough [x ] sputum[ ] hemoptysis[ ]                                                                  NEGATIVE[ ]   GI:  nausea[ ]  vommiting [ ]  diarrhea[ ] constipation [ ] melena [ ]                                                                      NEGATIVE[ x]   : hematuria[ ]  dysuria[ ] urgency[ ] incontinence[ ]                                                                                            NEGATIVE[x ]   MUSKULOSKELETAL:  arthritis[ ]  joint swelling [ ] muscle weakness [ ]                                                                NEGATIVE[ x]   SKIN/BREAST:  rash[ ] itching [ ]  hair loss[ ] masses[ ]                                                                                              NEGATIVE[x ]   PSYCH:  dementia [ ] depresion [ ] anxiety[ ]                                                                                                               NEGATIVE[x ]   HEME/LYMPH:  bruises easily[ ] enlarged lymph nodes[ ] tender lymph nodes[ ]                                               NEGATIVE[x ]   ENDOCRINE:  cold intolerance[ ] heat intolerance[ ] polydipsia[ ]                                                                          NEGATIVE[x ]     PHYSICAL EXAM  Vital Signs Last 24 Hrs  T(C): 37.1, Max: 37.1 (05-07 @ 10:27)  T(F): 98.8, Max: 98.8 (05-07 @ 10:27)  HR: 104 (98 - 105)  BP: 140/71 (126/72 - 148/99)  BP(mean): --  RR: 22 (20 - 22)  SpO2: 94% (94% - 96%)    CONSTITUTIONAL:                                                                          WNL[x ]   Neuro: WNL[x ] Normal exam oriented to person/place & time with no focal motor or sensory  deficits. Other                     Eyes: WNL[ x]   Normal exam of conjunctiva & lids, pupils equally reactive. Other     ENT: WNL[x    Normal exam of nasal/oral mucosa with absence of cyanosis. Other  Neck: WNL[ x]  Normal exam of jugular veins, trachea & thyroid. Other  Chest: WNL[ ] Normal lung exam with good air movement absence of wheezes, rales, or rhonchi: Other  diminished B/L  Rt > Lt                                                                                CV:  Auscultation: normal [x ] S3[ ] S4[ ] Irregular [ ] Rub[ ] Clicks[ ]    Murmurs none:[x ]systolic [ ]  diastolic [ ] holosystolic [ ]  Carotids: No Bruits[x ] Other                 Abdominal Aorta: normal [ ] nonpalpable[ x]Other                                                                                      GI:           WNL[x ] Normal exam of abdomen, liver & spleen with no noted masses or tenderness. Other                                                                                                        Extremities: WNL[ ] Normal no evidence of cyanosis or deformity Edema: none[ ]trace[ ]1+[ x]2+[ ]3+[ ]4+[ ]  Lower Extremity Pulses: Right[2+  ] Left[2+ ]Varicosities[ ]  SKIN :WNL[x ] Normal exam to inspection & palation. Other:                                                          LABS:                        13.4   6.7   )-----------( 367      ( 07 May 2017 08:22 )             41.1     05-07    139  |  98  |  14.0  ----------------------------<  156<H>  4.4   |  26.0  |  0.71    Ca    9.5      07 May 2017 08:22  Phos  4.4     05-07  Mg     2.0     05-07    TPro  7.0  /  Alb  3.5  /  TBili  0.6  /  DBili  x   /  AST  11  /  ALT  8   /  AlkPhos  94  05-07    PT/INR - ( 07 May 2017 08:22 )   PT: 14.1 sec;   INR: 1.28 ratio             CARDIAC MARKERS ( 06 May 2017 15:53 )  x     / <0.01 ng/mL / x     / x     / x              Cardiac Cath: NA    TTE / ANGEL: Pending     CXR: The lungs demonstrate increased pulmonary vascular congestion. Small   bilateral pleural effusions are noted, underlying infiltrate and/or   atelectasis cannot be excluded. Again noted is a right upper lobe mass   with central lucency suggestive of cavitation. Right-sided Port-A-Cath   catheter seen unchanged in position.

## 2017-05-07 NOTE — PROGRESS NOTE ADULT - SUBJECTIVE AND OBJECTIVE BOX
seen for pleural effusions/SOB    no acute complaints. feels better since chest tube placed  no cp/palps  ROS otherwise negative.     MEDICATIONS  (STANDING):  digoxin     Tablet 0.25milliGRAM(s) Oral daily  furosemide    Tablet 40milliGRAM(s) Oral daily  potassium chloride    Tablet ER 10milliEquivalent(s) Oral daily  insulin lispro (HumaLOG) corrective regimen sliding scale  SubCutaneous three times a day before meals  dextrose 5%. 1000milliLiter(s) IV Continuous <Continuous>  dextrose 50% Injectable 12.5Gram(s) IV Push once  dextrose 50% Injectable 25Gram(s) IV Push once  dextrose 50% Injectable 25Gram(s) IV Push once  diltiazem   CD 240milliGRAM(s) Oral daily    MEDICATIONS  (PRN):  dextrose Gel 1Dose(s) Oral once PRN Blood Glucose LESS THAN 70 milliGRAM(s)/deciliter  glucagon  Injectable 1milliGRAM(s) IntraMuscular once PRN Glucose LESS THAN 70 milligrams/deciliter      Allergies    No Known Allergies    Intolerances    atorvastatin (Joint Pain; Muscle Pain)    Vital Signs Last 24 Hrs  T(C): 37.1, Max: 37.1 (05-07 @ 10:27)  T(F): 98.8, Max: 98.8 (05-07 @ 10:27)  HR: 98 (98 - 105)  BP: 140/88 (126/72 - 148/99)  BP(mean): --  RR: 22 (20 - 22)  SpO2: 94% (91% - 96%)    PHYSICAL EXAM:    GENERAL: NAD  CHEST/LUNG: diffuse crackles with wheezing.  right chest tube in place  dec bs at left base  HEART: irreg irreg rate and rhythm; S1 S2  ABDOMEN: Soft, Nontender, Nondistended; Bowel sounds present  EXTREMITIES +1 edema   NERVOUS SYSTEM:  Alert & Oriented X3, nonfocal  PSYCH: normal mood, appropriate response.    LABS:                        13.4   6.7   )-----------( 367      ( 07 May 2017 08:22 )             41.1     05-07    139  |  98  |  14.0  ----------------------------<  156<H>  4.4   |  26.0  |  0.71    Ca    9.5      07 May 2017 08:22  Phos  4.4     05-07  Mg     2.0     05-07    TPro  7.0  /  Alb  3.5  /  TBili  0.6  /  DBili  x   /  AST  11  /  ALT  8   /  AlkPhos  94  05-07    PT/INR - ( 07 May 2017 08:22 )   PT: 14.1 sec;   INR: 1.28 ratio               CAPILLARY BLOOD GLUCOSE  162 (07 May 2017 09:09)  146 (06 May 2017 21:51)        RADIOLOGY & ADDITIONAL TESTS:

## 2017-05-07 NOTE — CONSULT NOTE ADULT - SUBJECTIVE AND OBJECTIVE BOX
HPI: Patient is a 67y Male seen on consultation for the evaluation and management of lung cancer.  Patient was diagnosed 7/16 with inoperable non small cell lung cancer, managed by Dr. Castro with Carboplatnin/Taxotere until 4/17. Had presented with malignant pericardial effusion, by report. Switched to Opdivo after radiologic evidence of progression, and has had one cycle. Presented to ER with progressively worsening SOB, found to have bilateral pleural effusions.  Now has right-sided chest tube, with possible left-sided one to be placed.  Admits to fatigue, 10 pound weight loss.  Denies cough or hemoptysis.      PAST MEDICAL & SURGICAL HISTORY:  Atrial fibrillation  Pericardial effusion  HTN (hypertension)  Hypercholesteremia  Diabetes  No significant past surgical history      REVIEW OF SYSTEMS      General:Fatigue	; no fevers, chills or sweats    Skin/Breast:No rash  	  Ophthalmologic:No blurry vision  	  ENMT:	No dysphagia or odynophagia    Respiratory and Thorax:Dyspnea  	  Cardiovascular:	No chest pain    Gastrointestinal:	No abdominal pain    Genitourinary:No dysuria or hematuria	    Musculoskeletal:No bone pain	    Neurological:No seizure or tremors	      Hematology/Lymphatics:No bleeding	    	    	    MEDICATIONS  (STANDING):  digoxin     Tablet 0.25milliGRAM(s) Oral daily  potassium chloride    Tablet ER 10milliEquivalent(s) Oral daily  insulin lispro (HumaLOG) corrective regimen sliding scale  SubCutaneous three times a day before meals  dextrose 5%. 1000milliLiter(s) IV Continuous <Continuous>  dextrose 50% Injectable 12.5Gram(s) IV Push once  dextrose 50% Injectable 25Gram(s) IV Push once  dextrose 50% Injectable 25Gram(s) IV Push once  diltiazem   CD 240milliGRAM(s) Oral daily  diltiazem    Tablet 120milliGRAM(s) Oral once  furosemide   Injectable 40milliGRAM(s) IV Push daily  multivitamin 1Tablet(s) Oral daily    MEDICATIONS  (PRN):  dextrose Gel 1Dose(s) Oral once PRN Blood Glucose LESS THAN 70 milliGRAM(s)/deciliter  glucagon  Injectable 1milliGRAM(s) IntraMuscular once PRN Glucose LESS THAN 70 milligrams/deciliter  levalbuterol Inhalation 0.63milliGRAM(s) Inhalation every 6 hours PRN sob and wheezing  ipratropium    for Nebulization 500MICROGram(s) Inhalation every 6 hours PRN Shortness of Breath and/or Wheezing      Allergies    No Known Allergies    Intolerances    atorvastatin (Joint Pain; Muscle Pain)      SOCIAL HISTORY:    Smoking Status:Former smoker  Alcohol:Denies  Marital Status:  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    FAMILY HISTORY:  No pertinent family history in first degree relatives            		  	    Vital Signs Last 24 Hrs  T(C): 37.1, Max: 37.1 (05-07 @ 10:27)  T(F): 98.8, Max: 98.8 (05-07 @ 10:27)  HR: 104 (98 - 105)  BP: 140/71 (126/72 - 148/99)  BP(mean): --  RR: 22 (20 - 22)  SpO2: 94% (91% - 96%)    PHYSICAL EXAM:      Constitutional:WD adequately nourished, NAD    Eyes:Anicteric    ENMT:No lesion    Neck:No adenopathy            Respiratory:Decreased BS bibaliar and 1/2 up right side    Cardiovascular:Irreg rhythm    Gastrointestinal:Soft, non-tender            Extremities:one plus pedal/ankle edema            Skin:No rash    Lymph Nodes:no adenopathy                LABS:                        13.4   6.7   )-----------( 367      ( 07 May 2017 08:22 )             41.1     05-07    139  |  98  |  14.0  ----------------------------<  156<H>  4.4   |  26.0  |  0.71    Ca    9.5      07 May 2017 08:22  Phos  4.4     05-07  Mg     2.0     05-07    TPro  7.0  /  Alb  3.5  /  TBili  0.6  /  DBili  x   /  AST  11  /  ALT  8   /  AlkPhos  94  05-07    PT/INR - ( 07 May 2017 08:22 )   PT: 14.1 sec;   INR: 1.28 ratio               RADIOLOGY & ADDITIONAL STUDIES:CT chest reviewed F/U Dr. Overton.  5/8/17:    This is a katie  67 y male seen on consultation for the evaluation and management of lung cancer.  Patient was diagnosed 7/16 with inoperable non small cell lung cancer, managed by Dr. Castro with Carboplatnin/Taxotere until 4/17. Had presented with malignant pericardial effusion, by report. Switched to Opdivo after radiologic evidence of progression, and has had one cycle. Presented to ER with progressively worsening SOB, found to have bilateral pleural effusions.  Now has right-sided chest tube, with possible left-sided one to be placed.  Admits to fatigue, 10 pound weight loss.  Denies cough or hemoptysis.      Since admission he has had B/L C-T placed draining serosanguinous fluid with relief of the dyspnea.    PAST MEDICAL & SURGICAL HISTORY:  Atrial fibrillation  Pericardial effusion  HTN (hypertension)  Hypercholesteremia  Diabetes  No significant past surgical history      REVIEW OF SYSTEMS  Dyspnea improved.  no cough or CP.  No HAs or bone pain.  	    	    MEDICATIONS  (STANDING):  digoxin     Tablet 0.25milliGRAM(s) Oral daily  potassium chloride    Tablet ER 10milliEquivalent(s) Oral daily  insulin lispro (HumaLOG) corrective regimen sliding scale  SubCutaneous three times a day before meals  dextrose 5%. 1000milliLiter(s) IV Continuous <Continuous>  dextrose 50% Injectable 12.5Gram(s) IV Push once  dextrose 50% Injectable 25Gram(s) IV Push once  dextrose 50% Injectable 25Gram(s) IV Push once  diltiazem   CD 240milliGRAM(s) Oral daily  diltiazem    Tablet 120milliGRAM(s) Oral once  furosemide   Injectable 40milliGRAM(s) IV Push daily  multivitamin 1Tablet(s) Oral daily    MEDICATIONS  (PRN):  dextrose Gel 1Dose(s) Oral once PRN Blood Glucose LESS THAN 70 milliGRAM(s)/deciliter  glucagon  Injectable 1milliGRAM(s) IntraMuscular once PRN Glucose LESS THAN 70 milligrams/deciliter  levalbuterol Inhalation 0.63milliGRAM(s) Inhalation every 6 hours PRN sob and wheezing  ipratropium    for Nebulization 500MICROGram(s) Inhalation every 6 hours PRN Shortness of Breath and/or Wheezing      Allergies    No Known Allergies    Intolerances    atorvastatin (Joint Pain; Muscle Pain)      SOCIAL HISTORY:    Smoking Status:Former smoker  Alcohol:Denies  Marital Status:  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    FAMILY HISTORY:  No pertinent family history in first degree relatives            		  	    Vital Signs Last 24 Hrs  T(C): 36.6, Max: 36.6 (05-08 @ 16:22)  T(F): 97.8, Max: 97.8 (05-08 @ 16:22)  HR: 82 (82 - 85)  BP: 122/60 (108/50 - 122/60)  BP(mean): --  RR: 20 (19 - 20)  SpO2: 96% (93% - 96 % )      labs;  CBC Full  -  ( 08 May 2017 06:06 )  WBC Count : 6.8 K/uL  Hemoglobin : 12.9 g/dL  Hematocrit : 39.7 %  Platelet Count - Automated : 357 K/uL  Mean Cell Volume : 89.8 fl  Mean Cell Hemoglobin : 29.2 pg  Mean Cell Hemoglobin Concentration : 32.5 g/dL  Auto Neutrophil # : x  Auto Lymphocyte # : x  Auto Monocyte # : x  Auto Eosinophil # : x  Auto Basophil # : x  Auto Neutrophil % : x  Auto Lymphocyte % : x  Auto Monocyte % : x  Auto Eosinophil % : x  Auto Basophil % : x    05-08    137  |  97<L>  |  21.0<H>  ----------------------------<  141<H>  4.0   |  25.0  |  0.63    Ca    9.2      08 May 2017 06:06  Phos  4.4     05-07  Mg     2.0     05-07    TPro  7.0  /  Alb  3.5  /  TBili  0.6  /  DBili  x   /  AST  11  /  ALT  8   /  AlkPhos  94  05-07       EXAM:  CT CHEST                          PROCEDURE DATE:  05/06/2017        INTERPRETATION:  CLINICAL INFORMATION: Lung cancer and pleural effusions.   Shortness of breath.    TECHNIQUE: Noncontrast CT scan of the chest was performed from the   thoracic inlet to the adrenal glands with coronal and sagittal reformats.     COMPARISON: PET CT 4/18/2017.    FINDINGS:   Evaluation of the mediastinum, pleura and soft tissues is limited without   intravenous contrast.    Lungs and airways: The trachea and main bronchi are patent. There are   large bilateral pleural effusions which are increased since 4/18/2017.   There is adjacent compressive atelectasis. A right apical cavitary,   multinodular lung mass has increased in size since 4/18/2017 and now   measures approximately 6.9 x 5.9 x 4.2 cm (TR x AP x CC). (Series 3,   image 60 and series 5, image 109). There is mild diffuse centrilobular   emphysema. The mass abuts the pleura. There is no pneumothorax.     Heart and vessels: The heart is enlarged and there is a trace pericardial   effusion. There are calcifications of the coronary arteries. There is a   right internal jugular approach [id number] catheter with tip   terminating in the distal superior vena cava. The thoracic aorta andmain   pulmonary artery are normal caliber.     Mediastinum and soft tissues: The thyroid gland is unremarkable. There is   no axillary lymphadenopathy. There is mediastinal and right hilar   lymphadenopathy measuring up to 2.4 cm in the aortopulmonary window and   2.3 cm in the right hilar region, increased since the previous   examination.     Upper abdomen: Motion degraded, but appears within normal limit                   RADIOLOGY & ADDITIONAL STUDIES: CT chest reviewed F/U Dr. Overton.  5/9/17:      This is a katie  67 y male seen on consultation for the evaluation and management of lung cancer.  Patient was diagnosed 7/16 with inoperable non small cell lung cancer, managed by Dr. Castro with Carboplatnin/Taxotere until 4/17. Had presented with malignant pericardial effusion, by report. Switched to Opdivo after radiologic evidence of progression, and has had one cycle. Presented to ER with progressively worsening SOB, found to have bilateral pleural effusions.  Now has right-sided chest tube, with possible left-sided one to be placed.  Admits to fatigue, 10 pound weight loss.  Denies cough or hemoptysis.      Since admission he has had B/L C-T placed draining serosanguinous fluid with relief of the dyspnea.      C-T removed, had good drainage of the B/L pleural effusions.    PAST MEDICAL & SURGICAL HISTORY:  Atrial fibrillation  Pericardial effusion  HTN (hypertension)  Hypercholesteremia  Diabetes  No significant past surgical history      REVIEW OF SYSTEMS  Dyspnea improved.  no cough or CP.  No HAs or bone pain.  	    	    MEDICATIONS  (STANDING):  digoxin     Tablet 0.25milliGRAM(s) Oral daily  potassium chloride    Tablet ER 10milliEquivalent(s) Oral daily  insulin lispro (HumaLOG) corrective regimen sliding scale  SubCutaneous three times a day before meals  dextrose 5%. 1000milliLiter(s) IV Continuous <Continuous>  dextrose 50% Injectable 12.5Gram(s) IV Push once  dextrose 50% Injectable 25Gram(s) IV Push once  dextrose 50% Injectable 25Gram(s) IV Push once  diltiazem   CD 240milliGRAM(s) Oral daily  diltiazem    Tablet 120milliGRAM(s) Oral once  furosemide   Injectable 40milliGRAM(s) IV Push daily  multivitamin 1Tablet(s) Oral daily    MEDICATIONS  (PRN):  dextrose Gel 1Dose(s) Oral once PRN Blood Glucose LESS THAN 70 milliGRAM(s)/deciliter  glucagon  Injectable 1milliGRAM(s) IntraMuscular once PRN Glucose LESS THAN 70 milligrams/deciliter  levalbuterol Inhalation 0.63milliGRAM(s) Inhalation every 6 hours PRN sob and wheezing  ipratropium    for Nebulization 500MICROGram(s) Inhalation every 6 hours PRN Shortness of Breath and/or Wheezing      Allergies    No Known Allergies    Intolerances    atorvastatin (Joint Pain; Muscle Pain)      SOCIAL HISTORY:    Smoking Status:Former smoker  Alcohol:Denies  Marital Status:  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    FAMILY HISTORY:  No pertinent family history in first degree relatives            		  	    Vital Signs Last 24 Hrs  T(C): 36.6, Max: 36.6 (05-08 @ 16:22)  T(F): 97.8, Max: 97.8 (05-08 @ 16:22)  HR: 82 (82 - 85)  BP: 122/60 (108/50 - 122/60)  BP(mean): --  RR: 20 (19 - 20)  SpO2: 96% (93% - 96 % )    CBC Full  -  ( 09 May 2017 06:19 )  WBC Count : 8.0 K/uL  Hemoglobin : 13.4 g/dL  Hematocrit : 40.9 %  Platelet Count - Automated : 384 K/uL  Mean Cell Volume : 89.7 fl  Mean Cell Hemoglobin : 29.4 pg  Mean Cell Hemoglobin Concentration : 32.8 g/dL  Auto Neutrophil # : x  Auto Lymphocyte # : x  Auto Monocyte # : x  Auto Eosinophil # : x  Auto Basophil # : x  Auto Neutrophil % : x  Auto Lymphocyte % : x  Auto Monocyte % : x  Auto Eosinophil % : x  Auto Basophil % : x      05-09    135  |  95<L>  |  23.0<H>  ----------------------------<  150<H>  4.2   |  27.0  |  0.77    Ca    9.3      09 May 2017 06:19        05-08    137  |  97<L>  |  21.0<H>  ----------------------------<  141<H>  4.0   |  25.0  |  0.63    Ca    9.2      08 May 2017 06:06  Phos  4.4     05-07  Mg     2.0     05-07    TPro  7.0  /  Alb  3.5  /  TBili  0.6  /  DBili  x   /  AST  11  /  ALT  8   /  AlkPhos  94  05-07       EXAM:  CT CHEST                          PROCEDURE DATE:  05/06/2017        INTERPRETATION:  CLINICAL INFORMATION: Lung cancer and pleural effusions.   Shortness of breath.    TECHNIQUE: Noncontrast CT scan of the chest was performed from the   thoracic inlet to the adrenal glands with coronal and sagittal reformats.     COMPARISON: PET CT 4/18/2017.    FINDINGS:   Evaluation of the mediastinum, pleura and soft tissues is limited without   intravenous contrast.    Lungs and airways: The trachea and main bronchi are patent. There are   large bilateral pleural effusions which are increased since 4/18/2017.   There is adjacent compressive atelectasis. A right apical cavitary,   multinodular lung mass has increased in size since 4/18/2017 and now   measures approximately 6.9 x 5.9 x 4.2 cm (TR x AP x CC). (Series 3,   image 60 and series 5, image 109). There is mild diffuse centrilobular   emphysema. The mass abuts the pleura. There is no pneumothorax.     Heart and vessels: The heart is enlarged and there is a trace pericardial   effusion. There are calcifications of the coronary arteries. There is a   right internal jugular approach [id number] catheter with tip   terminating in the distal superior vena cava. The thoracic aorta andmain   pulmonary artery are normal caliber.     Mediastinum and soft tissues: The thyroid gland is unremarkable. There is   no axillary lymphadenopathy. There is mediastinal and right hilar   lymphadenopathy measuring up to 2.4 cm in the aortopulmonary window and   2.3 cm in the right hilar region, increased since the previous   examination.     Upper abdomen: Motion degraded, but appears within normal limit                   RADIOLOGY & ADDITIONAL STUDIES: CT chest reviewed

## 2017-05-07 NOTE — PROCEDURE NOTE - NSPROCDETAILS_GEN_ALL_CORE
percutaneous/secured in place/ultrasound assessment of fluid (location)/sterile dressing applied/dressing applied/Seldinger technique
ultrasound assessment of fluid (location)/sterile dressing applied/percutaneous/Seldinger technique/secured in place

## 2017-05-07 NOTE — PROGRESS NOTE ADULT - PROBLEM SELECTOR PLAN 4
On diltiazem and digoxin. Eliquis to be held for now.  consider switching to lovenox injections or coumadin for (reversibility)  echo

## 2017-05-07 NOTE — PROGRESS NOTE ADULT - SUBJECTIVE AND OBJECTIVE BOX
[facility] Complaint:      HPI:  67M presented with dyspnea. The patient reported that the symptoms first started about two weeks prior but he was still able to go to the gym for exercise. The symptoms worsened over the past week and the patient developed significant dyspnea on exertion. He also reported lower extremity edema and orthopnea. He denied any fevers, chills, or sick contacts. Prior records were obtained and reviewed. The patient was noted to have a prior admission last July for pericardial effusion requiring pericardiocentesis. The patient reported that the symptoms on the prior admission were not the same as this occurrence. He denied any associated palpitations or chest pain. He is unaware of any relieving factors.  On his prior visit to his cardiologist, the patient was started on Eliquis for anticoagulation. (06 May 2017 19:08)      Pleural effusion, not elsewhere classified  Unknown h/o HF  No pertinent family history in first degree relatives  MEWS Score  Atrial fibrillation  Pericardial effusion  HTN (hypertension)  Hypercholesteremia  Diabetes  Pleural effusion  Atrial fibrillation  HTN (hypertension)  Diabetes  Lung cancer  Pleural effusion  No significant past surgical history  SOB  90+  Lung cancer  SOB (shortness of breath)      REVIEW OF SYSTEMS    General:	Denies fever, chills, pain, no discomfort  Skin  	  Respiratory and Thorax:  Denies cough, sob, or any discomfort  	  Cardiovascular:  Denies chest pain, palpiations, or any discomfort	    Gastrointestinal:  Denies n/v/d, constipation, or any discomfort    Genitourinary:  Denies frequency, burning, or pain    Musculoskeletal:  Denies joint pain, swelling, or any discomfort     Neurological:  Denies headache, dizzyness, blurred vision, numbing or tingling    Psychiatric:  Denies sadness or depression    Hematology  Walter bleeding o swelling    Allergic/Immunologic:	  MEDICATIONS:  digoxin     Tablet 0.25milliGRAM(s) Oral daily  diltiazem   CD 240milliGRAM(s) Oral daily  diltiazem    Tablet 120milliGRAM(s) Oral once  furosemide   Injectable 40milliGRAM(s) IV Push daily  levalbuterol Inhalation 0.63milliGRAM(s) Inhalation every 6 hours PRN  ipratropium    for Nebulization 500MICROGram(s) Inhalation every 6 hours PRN  insulin lispro (HumaLOG) corrective regimen sliding scale  SubCutaneous three times a day before meals  dextrose Gel 1Dose(s) Oral once PRN  dextrose 50% Injectable 12.5Gram(s) IV Push once  dextrose 50% Injectable 25Gram(s) IV Push once  dextrose 50% Injectable 25Gram(s) IV Push once  glucagon  Injectable 1milliGRAM(s) IntraMuscular once PRN    potassium chloride    Tablet ER 10milliEquivalent(s) Oral daily  dextrose 5%. 1000milliLiter(s) IV Continuous <Continuous>  multivitamin 1Tablet(s) Oral daily        PHYSICAL EXAM:    T(C): 37.1, Max: 37.1 (05-07 @ 10:27)  HR: 104 (98 - 105)  BP: 140/71 (126/72 - 148/99)  RR: 22 (20 - 22)  SpO2: 94% (91% - 96%)  Wt(kg): --    I&O's Summary      Daily Height in cm: 180.34 (06 May 2017 14:45)    Daily     Appearance: Normal	  HEENT:   Normal oral mucosa, PERRL, EOMI	  Lymphatic: No lymphadenopathy  Cardiovascular: Normal S1 S2, No JVD, No murmurs, No edema  Respiratory: Lungs clear to auscultation	  Psychiatry: A & O x 3, Mood & affect appropriate  Gastrointestinal:  Soft, Non-tender, + BS	  Skin: No rashes, No ecchymoses, No cyanosis  Neurologic: Non-focal  Extremities: Normal range of motion, No clubbing, cyanosis or edema  Vascular: Peripheral pulses palpable 2+ bilaterally      TELEMETRY: 	 Afib              13.4   6.7   )-----------( 367      ( 07 May 2017 08:22 )             41.1     05-07    139  |  98  |  14.0  ----------------------------<  156<H>  4.4   |  26.0  |  0.71    Ca    9.5      07 May 2017 08:22  Phos  4.4     05-07  Mg     2.0     05-07    TPro  7.0  /  Alb  3.5  /  TBili  0.6  /  DBili  x   /  AST  11  /  ALT  8   /  AlkPhos  94  05-07 Cardiology follow up    Subjective and Objective data:    67M presented with dyspnea. Seen today bedside for sob.  Reviewed chart, medications and labs.  Admitted for pleural effusions and had Inserted right posterior chest tube, drained 2 liters in ED.     Plan as per CT surgery for chest tube on the left today.  Patient has pain at the chest tube site.  HR slightly elevated on monitor, will increase Cardizem 360 CD daily          Pleural effusion, not elsewhere classified  Unknown h/o HF  No pertinent family history in first degree relatives  MEWS Score  Atrial fibrillation  Pericardial effusion  HTN (hypertension)  Hypercholesteremia  Diabetes  Pleural effusion  Atrial fibrillation  HTN (hypertension)  Diabetes  Lung cancer  Pleural effusion  No significant past surgical history  SOB  90+  Lung cancer  SOB (shortness of breath)      REVIEW OF SYSTEMS    General: Denies fever, chills, pain, no discomfort  	  Respiratory and Thorax:  Denies cough, sob, or any discomfort  	  Cardiovascular:  Denies chest pain, palpitations or any discomfort	    Gastrointestinal:  Denies n/v/d, constipation, or any discomfort    Genitourinary:  Denies frequency, burning, or pain    Musculoskeletal:  Denies joint pain, swelling, or any discomfort     Neurological:  Denies headache, dizziness blurred vision, numbing or tingling    Hematology  Walter bleeding o swelling    Allergic/Immunologic:	  MEDICATIONS:  digoxin     Tablet 0.25milliGRAM(s) Oral daily  diltiazem   CD 240milliGRAM(s) Oral daily  diltiazem    Tablet 120milliGRAM(s) Oral once  furosemide   Injectable 40milliGRAM(s) IV Push daily  levalbuterol Inhalation 0.63milliGRAM(s) Inhalation every 6 hours PRN  ipratropium    for Nebulization 500MICROGram(s) Inhalation every 6 hours PRN  insulin lispro (HumaLOG) corrective regimen sliding scale  SubCutaneous three times a day before meals  dextrose Gel 1Dose(s) Oral once PRN  dextrose 50% Injectable 12.5Gram(s) IV Push once  dextrose 50% Injectable 25Gram(s) IV Push once  dextrose 50% Injectable 25Gram(s) IV Push once  glucagon  Injectable 1milliGRAM(s) IntraMuscular once PRN  potassium chloride    Tablet ER 10milliEquivalent(s) Oral daily  dextrose 5%. 1000milliLiter(s) IV Continuous <Continuous>  multivitamin 1Tablet(s) Oral daily        PHYSICAL EXAM:    T(C): 37.1, Max: 37.1 (05-07 @ 10:27)  HR: 104 (98 - 105)  BP: 140/71 (126/72 - 148/99)  RR: 22 (20 - 22)  SpO2: 94% (91% - 96%)  Wt(kg): --    I&O's Summary      Daily Height in cm: 180.34 (06 May 2017 14:45)    Daily     Appearance: Normal	  HEENT:   Normal oral mucosa, PERRL, EOMI	  Lymphatic: No lymphadenopathy  Cardiovascular: No JVD, No murmurs, s1,S2, slightly elevated rate of 105 irregular, +1 bilateral lower extremity edema  Respiratory: Lungs clear to auscultation	  Psychiatry: A & O x 3, Mood & affect appropriate  Gastrointestinal:  Soft, Non-tender, + BS	  Skin: No rashes, No ecchymoses, No cyanosis  Neurologic: Non-focal  Extremities: Normal range of motion, No clubbing, cyanosis or edema  Vascular: Peripheral pulses palpable 2+ bilaterally      TELEMETRY: 	 Afib              13.4   6.7   )-----------( 367      ( 07 May 2017 08:22 )             41.1     05-07    139  |  98  |  14.0  ----------------------------<  156<H>  4.4   |  26.0  |  0.71    Ca    9.5      07 May 2017 08:22  Phos  4.4     05-07  Mg     2.0     05-07    TPro  7.0  /  Alb  3.5  /  TBili  0.6  /  DBili  x   /  AST  11  /  ALT  8   /  AlkPhos  94  05-07      Assessment and Plan:   · Assessment		  67M with a history of stage 4 lung cancer (s/p chemotherapy, now on opdivo) and prior pericardial effusion with dyspnea and bilateral pleural effusions.  Right chest tube currently, plan for left chest tube today.      Problem/Plan - 1:  ·  Problem: Pleural effusion.  Plan: s/p right chest and plan for left chest tube.  Plan per NP for CT surgery is left chest tube today.    CT sx following---will send fluid studies.   Patient has slight pain at site and will add percocet.      Problem/Plan - 2:  ·  Problem: Atrial fibrillation.  Plan: On diltiazem and digoxin. Eliquis to be held for now.  consider switching to Lovenox injections or coumadin for (reversibility).  For now hold off on all AC.    echo.       Problem/Plan - 3:  ·  Problem: Lung cancer.  Plan: Recent PET scan noted progression of disease.  oncology seen by. Cardiology follow up    Subjective and Objective data:    67M presented with dyspnea. Seen today bedside for sob.  Reviewed chart, medications and labs.  Admitted for pleural effusions and had Inserted right posterior chest tube, drained 2 liters in ED.     Plan as per CT surgery for chest tube on the left today.  Patient has pain at the chest tube site.  HR slightly elevated on monitor, will increase Cardizem 360 CD daily          Pleural effusion, not elsewhere classified  Unknown h/o HF  No pertinent family history in first degree relatives  MEWS Score  Atrial fibrillation  Pericardial effusion  HTN (hypertension)  Hypercholesteremia  Diabetes  Pleural effusion  Atrial fibrillation  HTN (hypertension)  Diabetes  Lung cancer  Pleural effusion  No significant past surgical history  SOB  90+  Lung cancer  SOB (shortness of breath)      REVIEW OF SYSTEMS    General: Denies fever, chills, pain, no discomfort  	  Respiratory and Thorax:  Denies cough, sob, or any discomfort  	  Cardiovascular:  Denies chest pain, palpitations or any discomfort	    Gastrointestinal:  Denies n/v/d, constipation, or any discomfort    Genitourinary:  Denies frequency, burning, or pain    Musculoskeletal:  Denies joint pain, swelling, or any discomfort     Neurological:  Denies headache, dizziness blurred vision, numbing or tingling    Hematology  Walter bleeding o swelling    Allergic/Immunologic:	  MEDICATIONS:  digoxin     Tablet 0.25milliGRAM(s) Oral daily  diltiazem   CD 240milliGRAM(s) Oral daily  diltiazem    Tablet 120milliGRAM(s) Oral once  furosemide   Injectable 40milliGRAM(s) IV Push daily  levalbuterol Inhalation 0.63milliGRAM(s) Inhalation every 6 hours PRN  ipratropium    for Nebulization 500MICROGram(s) Inhalation every 6 hours PRN  insulin lispro (HumaLOG) corrective regimen sliding scale  SubCutaneous three times a day before meals  dextrose Gel 1Dose(s) Oral once PRN  dextrose 50% Injectable 12.5Gram(s) IV Push once  dextrose 50% Injectable 25Gram(s) IV Push once  dextrose 50% Injectable 25Gram(s) IV Push once  glucagon  Injectable 1milliGRAM(s) IntraMuscular once PRN  potassium chloride    Tablet ER 10milliEquivalent(s) Oral daily  dextrose 5%. 1000milliLiter(s) IV Continuous <Continuous>  multivitamin 1Tablet(s) Oral daily        PHYSICAL EXAM:    T(C): 37.1, Max: 37.1 (05-07 @ 10:27)  HR: 104 (98 - 105)  BP: 140/71 (126/72 - 148/99)  RR: 22 (20 - 22)  SpO2: 94% (91% - 96%)  Wt(kg): --    I&O's Summary      Daily Height in cm: 180.34 (06 May 2017 14:45)    Daily     Appearance: Normal	  HEENT:   Normal oral mucosa, PERRL, EOMI	  Lymphatic: No lymphadenopathy  Cardiovascular: No JVD, No murmurs, s1,S2, slightly elevated rate of 105 irregular, +1 bilateral lower extremity edema  Respiratory: Lungs clear to auscultation	  Psychiatry: A & O x 3, Mood & affect appropriate  Gastrointestinal:  Soft, Non-tender, + BS	  Skin: No rashes, No ecchymoses, No cyanosis  Neurologic: Non-focal  Extremities: Normal range of motion, No clubbing, cyanosis or edema  Vascular: Peripheral pulses palpable 2+ bilaterally      TELEMETRY: 	 Afib              13.4   6.7   )-----------( 367      ( 07 May 2017 08:22 )             41.1     05-07    139  |  98  |  14.0  ----------------------------<  156<H>  4.4   |  26.0  |  0.71    Ca    9.5      07 May 2017 08:22  Phos  4.4     05-07  Mg     2.0     05-07    TPro  7.0  /  Alb  3.5  /  TBili  0.6  /  DBili  x   /  AST  11  /  ALT  8   /  AlkPhos  94  05-07      Assessment and Plan:   · Assessment		  67M with a history of stage 4 lung cancer (s/p chemotherapy, now on opdivo) and prior pericardial effusion with dyspnea and bilateral pleural effusions.  Right chest tube currently, plan for left chest tube today.      Problem/Plan - 1:  ·  Problem: Pleural effusion.  Plan: s/p right chest and plan for left chest tube.  Plan per NP for CT surgery is left chest tube today.    CT sx following---will send fluid studies.   Patient has slight pain at site and will add percocet.      Problem/Plan - 2:  ·  Problem: Atrial fibrillation.  Plan: On diltiazem (increase to 360 dialy) and digoxin. Eliquis to be held for now.  consider switching to Lovenox injections or coumadin for (reversibility).  For now hold off on all AC.    echo.       Problem/Plan - 3:  ·  Problem: Lung cancer.  Plan: Recent PET scan noted progression of disease.  oncology seen by.

## 2017-05-07 NOTE — CONSULT NOTE ADULT - ASSESSMENT
Imp:  Mr. Brown is seen on consultation for non small cell lung cancer, now on Opdivo, admitted with SOB secondary to pleural effusions.  CT placed, managed by Surgery.  Will review outpatient records and follow up. Metastatic NSCLC  with progression despite chemo with carbo/Taxotere then Opdivo, has had only one treatment with Opdivo.  Dyspnea due to B/L pleural effusions better post C-T placement and drainage.

## 2017-05-07 NOTE — CONSULT NOTE ADULT - SUBJECTIVE AND OBJECTIVE BOX
Surgeon: Louis     Consult requesting by: Chas    HISTORY OF PRESENT ILLNESS:  67M presented with dyspnea. The patient reported that the symptoms first started about two weeks prior but he was still able to go to the gym for exercise. The symptoms worsened over the past week and the patient developed significant dyspnea on exertion. He also reported lower extremity edema and orthopnea. He denied any fevers, chills, or sick contacts. Prior records were obtained and reviewed. The patient was noted to have a prior admission last July for pericardial effusion requiring pericardiocentesis. The patient reported that the symptoms on the prior admission were not the same as this occurrence. He denied any associated palpitations or chest pain. He is unaware of any relieving factors.  On his prior visit to his cardiologist, the patient was started on Eliquis for anticoagulation.  Noted to have b/l plural effusions on CXR.  Called to consult for drainage of effusions     PAST MEDICAL & SURGICAL HISTORY:  Atrial fibrillation  Pericardial effusion  HTN (hypertension)  Hypercholesteremia  Diabetes  No significant past surgical history      MEDICATIONS  (STANDING):  digoxin     Tablet 0.25milliGRAM(s) Oral daily  potassium chloride    Tablet ER 10milliEquivalent(s) Oral daily  insulin lispro (HumaLOG) corrective regimen sliding scale  SubCutaneous three times a day before meals  dextrose 5%. 1000milliLiter(s) IV Continuous <Continuous>  dextrose 50% Injectable 12.5Gram(s) IV Push once  dextrose 50% Injectable 25Gram(s) IV Push once  dextrose 50% Injectable 25Gram(s) IV Push once  furosemide   Injectable 40milliGRAM(s) IV Push daily  multivitamin 1Tablet(s) Oral daily  enoxaparin Injectable 100milliGRAM(s) SubCutaneous every 12 hours    MEDICATIONS  (PRN):  dextrose Gel 1Dose(s) Oral once PRN Blood Glucose LESS THAN 70 milliGRAM(s)/deciliter  glucagon  Injectable 1milliGRAM(s) IntraMuscular once PRN Glucose LESS THAN 70 milligrams/deciliter  levalbuterol Inhalation 0.63milliGRAM(s) Inhalation every 6 hours PRN sob and wheezing  ipratropium    for Nebulization 500MICROGram(s) Inhalation every 6 hours PRN Shortness of Breath and/or Wheezing  oxyCODONE  5 mG/acetaminophen 325 mG 1Tablet(s) Oral every 4 hours PRN pain    Antiplatelet therapy:     effient                      Last dose/amt: 5/6    Allergies    No Known Allergies    Intolerances    atorvastatin (Joint Pain; Muscle Pain)      SOCIAL HISTORY:  Smoker: [ ] Yes  [x ] No        PACK YEARS:   40                      WHEN QUIT? 2 years ago  ETOH use: [ ] Yes  [ x] No              FREQUENCY / QUANTITY:  Ilicit Drug use:  [ ] Yes  [x ] No  Occupation: retired   Live with: wife   Assisted device use: no    FAMILY HISTORY:  No pertinent family history in first degree relatives      Review of Systems  CONSTITUTIONAL:  Fevers[ ] chills[ ] sweats[ ] fatigue[ ] weight loss[ ] weight gain [ ]                                     NEGATIVE [x ]   NEURO:  parathesias[ ] seizures [ ]  syncope [ ]  confusion [ ]                                                                                NEGATIVE[x ]   EYES: glasses[ ]  blurry vision[ ]  discharge[ ] pain[ ] glaucoma [ ]                                                                          NEGATIVE[x ]   ENMT:  difficulty hearing [ ]  vertigo[ ]  dysphagia[ ] epistaxis[ ] recent dental work [ ]                                    NEGATIVE[x ]   CV:  chest pain[ ] palpitations[ ] ANDRES [x ] diaphoresis [ ]                                                                                           NEGATIVE[ ]   RESPIRATORY:  wheezing[ ] SOB[ x] cough [x ] sputum[ ] hemoptysis[ ]                                                                  NEGATIVE[ ]   GI:  nausea[ ]  vommiting [ ]  diarrhea[ ] constipation [ ] melena [ ]                                                                      NEGATIVE[x ]   : hematuria[ ]  dysuria[ ] urgency[ ] incontinence[ ]                                                                                            NEGATIVE[ x]   MUSKULOSKELETAL:  arthritis[ ]  joint swelling [ ] muscle weakness [ ]                                                                NEGATIVE[x ]   SKIN/BREAST:  rash[ ] itching [ ]  hair loss[ ] masses[ ]                                                                                              NEGATIVE[x ]   PSYCH:  dementia [ ] depresion [ ] anxiety[ ]                                                                                                               NEGATIVE[x ]   HEME/LYMPH:  bruises easily[ ] enlarged lymph nodes[ ] tender lymph nodes[ ]                                               NEGATIVE[x ]   ENDOCRINE:  cold intolerance[ ] heat intolerance[ ] polydipsia[ ]                                                                          NEGATIVE[x ]     PHYSICAL EXAM  Vital Signs Last 24 Hrs  T(C): 37.1, Max: 37.1 (05-07 @ 10:27)  T(F): 98.8, Max: 98.8 (05-07 @ 10:27)  HR: 104 (98 - 105)  BP: 140/71 (126/72 - 148/99)  BP(mean): --  RR: 22 (20 - 22)  SpO2: 94% (94% - 96%)    CONSTITUTIONAL:                                                                          WNL[x ]   Neuro: WNL[x ] Normal exam oriented to person/place & time with no focal motor or sensory  deficits. Other                     Eyes: WNL[x ]   Normal exam of conjunctiva & lids, pupils equally reactive. Other     ENT: WNL[x ]    Normal exam of nasal/oral mucosa with absence of cyanosis. Other  Neck: WNL[x ]  Normal exam of jugular veins, trachea & thyroid. Other  Chest: WNL[ ] Normal lung exam with good air movement absence of wheezes, rales, or rhonchi: Other   b/l decreased sounds with crackles R>L                                                                             CV:  Auscultation: normal [x ] S3[ ] S4[ ] Irregular [ ] Rub[ ] Clicks[ ]    Murmurs none:[ x]systolic [ ]  diastolic [ ] holosystolic [ ]  Carotids: No Bruits[ ] Other                 Abdominal Aorta: normal [ ] nonpalpable[ ]Other                                                                                      GI:           WNL[ ] Normal exam of abdomen, liver & spleen with no noted masses or tenderness. Other                                                                                                        Extremities: WNL[ ] Normal no evidence of cyanosis or deformity Edema: none[ ]trace[ ]1+[ ]2+[x ]3+[ ]4+[ ]  Lower Extremity Pulses: Right[2+ ] Left[2+ ]Varicosities[ ]  SKIN :WNL[x ] Normal exam to inspection & palation. Other:                                                          LABS:                        13.4   6.7   )-----------( 367      ( 07 May 2017 08:22 )             41.1     05-07    139  |  98  |  14.0  ----------------------------<  156<H>  4.4   |  26.0  |  0.71    Ca    9.5      07 May 2017 08:22  Phos  4.4     05-07  Mg     2.0     05-07    TPro  7.0  /  Alb  3.5  /  TBili  0.6  /  DBili  x   /  AST  11  /  ALT  8   /  AlkPhos  94  05-07    PT/INR - ( 07 May 2017 08:22 )   PT: 14.1 sec;   INR: 1.28 ratio             CARDIAC MARKERS ( 06 May 2017 15:53 )  x     / <0.01 ng/mL / x     / x     / x              Cardiac Cath: NA    TTE / ANGEL: Pending     CXR: The lungs demonstrate increased pulmonary vascular congestion. Small   bilateral pleural effusions are noted, underlying infiltrate and/or   atelectasis cannot be excluded. Again noted is a right upper lobe mass   with central lucency suggestive of cavitation. Right-sided Port-A-Cath   catheter seen unchanged in position.

## 2017-05-08 NOTE — PROGRESS NOTE ADULT - SUBJECTIVE AND OBJECTIVE BOX
CC: shortness of breath    INTERVAL HISTORY:Improved symptoms of shortness of breath.     MEDICATIONS  (STANDING):  digoxin     Tablet 0.25milliGRAM(s) Oral daily  potassium chloride    Tablet ER 10milliEquivalent(s) Oral daily  insulin lispro (HumaLOG) corrective regimen sliding scale  SubCutaneous three times a day before meals  dextrose 5%. 1000milliLiter(s) IV Continuous <Continuous>  dextrose 50% Injectable 12.5Gram(s) IV Push once  dextrose 50% Injectable 25Gram(s) IV Push once  dextrose 50% Injectable 25Gram(s) IV Push once  diltiazem   CD 360milliGRAM(s) Oral daily  furosemide   Injectable 40milliGRAM(s) IV Push daily  multivitamin 1Tablet(s) Oral daily  enoxaparin Injectable 100milliGRAM(s) SubCutaneous every 12 hours  acetaminophen   Tablet. 650milliGRAM(s) Oral every 6 hours  lidocaine   Patch 2Patch Transdermal daily    ROS: All others negative     PHYSICAL EXAM:  T(C): 36.4, Max: 37.1 (05-07 @ 17:22)  HR: 85 (82 - 108)  BP: 120/68 (120/68 - 125/80)  RR: 20 (18 - 20)  SpO2: 96% (93% - 96%)  Wt(kg): --  I&O's Summary  I & Os for 24h ending 08 May 2017 07:00  =============================================  IN: 500 ml / OUT: 3075 ml / NET: -2575 ml    I & Os for current day (as of 08 May 2017 14:59)  =============================================  IN: 700 ml / OUT: 1000 ml / NET: -300 ml      Appearance: Normal	  HEENT:   Normal oral mucosa, PERRL, EOMI	  Lymphatic: No lymphadenopathy  Cardiovascular: Normal S1 S2, No JVD, No murmurs, No edema  Respiratory: Lungs clear to auscultation	  Psychiatry: A & O x 3, Mood & affect appropriate  Gastrointestinal:  Soft, Non-tender, + BS	  Skin: No rashes, No ecchymoses, No cyanosis  Neurologic: Non-focal  Extremities: Normal range of motion, No clubbing, cyanosis or edema  Vascular: Peripheral pulses palpable 2+ bilaterally    TELEMETRY: afib with 90s-110.  	      LABS:	 	                        12.9   6.8   )-----------( 357      ( 08 May 2017 06:06 )             39.7     05-08    137  |  97<L>  |  21.0<H>  ----------------------------<  141<H>  4.0   |  25.0  |  0.63    Ca    9.2      08 May 2017 06:06  Phos  4.4     05-07  Mg     2.0     05-07    TPro  7.0  /  Alb  3.5  /  TBili  0.6  /  DBili  x   /  AST  11  /  ALT  8   /  AlkPhos  94  05-07

## 2017-05-08 NOTE — PROGRESS NOTE ADULT - PROBLEM SELECTOR PLAN 4
much improved control   c/w diltiazem and digoxin. Eliquis to be held for now.  consider switching to lovenox injections or coumadin for (reversibility)  echo-wnl

## 2017-05-08 NOTE — PROGRESS NOTE ADULT - SUBJECTIVE AND OBJECTIVE BOX
seen for pleural effusions/ stage 4 lung CA/ afib    no acute complaints.  feels much better, sob resolving. LE edema resolving.  ROS otherwise negative.     MEDICATIONS  (STANDING):  digoxin     Tablet 0.25milliGRAM(s) Oral daily  potassium chloride    Tablet ER 10milliEquivalent(s) Oral daily  insulin lispro (HumaLOG) corrective regimen sliding scale  SubCutaneous three times a day before meals  dextrose 5%. 1000milliLiter(s) IV Continuous <Continuous>  dextrose 50% Injectable 12.5Gram(s) IV Push once  dextrose 50% Injectable 25Gram(s) IV Push once  dextrose 50% Injectable 25Gram(s) IV Push once  diltiazem   CD 360milliGRAM(s) Oral daily  furosemide   Injectable 40milliGRAM(s) IV Push daily  multivitamin 1Tablet(s) Oral daily  enoxaparin Injectable 100milliGRAM(s) SubCutaneous every 12 hours    MEDICATIONS  (PRN):  dextrose Gel 1Dose(s) Oral once PRN Blood Glucose LESS THAN 70 milliGRAM(s)/deciliter  glucagon  Injectable 1milliGRAM(s) IntraMuscular once PRN Glucose LESS THAN 70 milligrams/deciliter  levalbuterol Inhalation 0.63milliGRAM(s) Inhalation every 6 hours PRN sob and wheezing  ipratropium    for Nebulization 500MICROGram(s) Inhalation every 6 hours PRN Shortness of Breath and/or Wheezing  oxyCODONE  5 mG/acetaminophen 325 mG 1Tablet(s) Oral every 4 hours PRN pain      Allergies    No Known Allergies    Intolerances    atorvastatin (Joint Pain; Muscle Pain)    MISCELLANEOUS:    Vital Signs Last 24 Hrs  T(C): 36.4, Max: 37.1 (05-07 @ 10:27)  T(F): 97.6, Max: 98.8 (05-07 @ 10:27)  HR: 82 (82 - 108)  BP: 120/68 (120/68 - 140/88)  BP(mean): --  RR: 19 (18 - 22)  SpO2: 93% (93% - 94%)    PHYSICAL EXAM:    GENERAL: NAD  CHEST/LUNG: decrease bs at bases  b/l chest tubes.  HEART: irreg irreg rate and rhythm; S1 S2; no murmurs noted  ABDOMEN: Soft, Nontender, Nondistended; Bowel sounds present  EXTREMITIES: +1 edema lower ankles.  NERVOUS SYSTEM:  Alert & Oriented X3, nonfocal  PSYCH: normal mood, appropriate response.    LABS:                        12.9   6.8   )-----------( 357      ( 08 May 2017 06:06 )             39.7     05-08    137  |  97<L>  |  21.0<H>  ----------------------------<  141<H>  4.0   |  25.0  |  0.63    Ca    9.2      08 May 2017 06:06  Phos  4.4     05-07  Mg     2.0     05-07    TPro  7.0  /  Alb  3.5  /  TBili  0.6  /  DBili  x   /  AST  11  /  ALT  8   /  AlkPhos  94  05-07    PT/INR - ( 07 May 2017 08:22 )   PT: 14.1 sec;   INR: 1.28 ratio               CAPILLARY BLOOD GLUCOSE  167 (08 May 2017 08:17)  180 (07 May 2017 21:17)  142 (07 May 2017 17:22)  161 (07 May 2017 12:21)        RADIOLOGY & ADDITIONAL TESTS:

## 2017-05-08 NOTE — CONSULT NOTE ADULT - ATTENDING COMMENTS
COUNSELING:    Face to face meeting to discuss Advanced Care Planning - Time Spent ______ Minutes.  See goals of care note.    More than 50% time spent in counseling and coordinating care. __45____ Minutes.     Thank you for the opportunity to assist with the care of this patient.   [facility] Consult [facility] [phone].

## 2017-05-08 NOTE — CONSULT NOTE ADULT - SUBJECTIVE AND OBJECTIVE BOX
HPI:CC: Intermittent breakthrough sharp stabbing pain  L>R at CT sites. SOB on exertion. I feel better  since they drained my lungs of a large amount of fluid.   Mr. Brown is OOB/CH alert oriented, caitlyn complexion. He is calm,  Productive cough of clear mucous.with some wheezing.B/L Ct's to pleurovac drainage collecting serosanguinous fluid. He denies constant pain,N/V/D/dizziness  trouble sleeping in ED and while in hospital.    67M presented with dyspnea. The patient reported that the symptoms first started about two weeks prior but he was still able to go to the gym for exercise. The symptoms worsened over the past week and the patient developed significant dyspnea on exertion. He also reported lower extremity edema and orthopnea. He denied any fevers, chills, or sick contacts. Prior records were obtained and reviewed. The patient was noted to have a prior admission last July for pericardial effusion requiring pericardiocentesis. The patient reported that the symptoms on the prior admission were not the same as this occurrence. He denied any associated palpitations or chest pain. He is unaware of any relieving factors.  On his prior visit to his cardiologist, the patient was started on Eliquis for anticoagulation. (06 May 2017 19:08)      PERTINENT PMH REVIEWED: Yes     PAST MEDICAL & SURGICAL HISTORY:  Atrial fibrillation  Pericardial effusion  HTN (hypertension)  Hypercholesteremia  Diabetes  No significant past surgical history      SOCIAL HISTORY:  EtOH   No                                    Drugs   No                                      nonsmoker                                    Admitted from: home HCP: Spouse Vivian Brown : Phone#:[phone]    FAMILY HISTORY:  No pertinent family history in first degree relatives      Allergies    No Known Allergies    ntolerances    atorvastatin (Joint Pain; Muscle Pain)      Baseline ADLs (prior to admission):  Independent/    Present Symptoms:     Dyspnea: 2    Nausea/Vomiting:  No  Anxiety:  Yes appropriate  Depression:  No  Fatigue: Yes No  Loss of appetite: Yes     Pain:             Character-            Duration-intemittent,            Effect-            Factors-activity movement coughing            Frequency-            Location-B/L postero-lateral CT sites            Severity-moderate to severe    Review of Systems: Reviewed                      All others negative    MEDICATIONS  (STANDING):  digoxin     Tablet 0.25milliGRAM(s) Oral daily  potassium chloride    Tablet ER 10milliEquivalent(s) Oral daily  insulin lispro (HumaLOG) corrective regimen sliding scale  SubCutaneous three times a day before meals  dextrose 5%. 1000milliLiter(s) IV Continuous <Continuous>  dextrose 50% Injectable 12.5Gram(s) IV Push once  dextrose 50% Injectable 25Gram(s) IV Push once  dextrose 50% Injectable 25Gram(s) IV Push once  diltiazem   CD 360milliGRAM(s) Oral daily  furosemide   Injectable 40milliGRAM(s) IV Push daily  multivitamin 1Tablet(s) Oral daily  enoxaparin Injectable 100milliGRAM(s) SubCutaneous every 12 hours  acetaminophen   Tablet. 650milliGRAM(s) Oral every 6 hours  lidocaine   Patch 2Patch Transdermal daily    MEDICATIONS  (PRN):  dextrose Gel 1Dose(s) Oral once PRN Blood Glucose LESS THAN 70 milliGRAM(s)/deciliter  glucagon  Injectable 1milliGRAM(s) IntraMuscular once PRN Glucose LESS THAN 70 milligrams/deciliter  levalbuterol Inhalation 0.63milliGRAM(s) Inhalation every 6 hours PRN sob and wheezing  ipratropium    for Nebulization 500MICROGram(s) Inhalation every 6 hours PRN Shortness of Breath and/or Wheezing  oxyCODONE IR 5milliGRAM(s) Oral every 4 hours PRN Moderate Pain (4 - 6)      PHYSICAL EXAM:    Vital Signs Last 24 Hrs  T(C): 36.4, Max: 37.1 (05-07 @ 17:22)  T(F): 97.6, Max: 98.8 (05-07 @ 17:22)  HR: 85 (82 - 108)  BP: 120/68 (120/68 - 140/71)  BP(mean): --  RR: 20 (18 - 20)  SpO2: 96% (93% - 96%)    General: alert  oriented x 3____     HEENT: normal  dry mouth      Lungs: comfortable tachypnea/labored breathing      CV: normal  tachycardia    GI: normal  distended  tender  no BS                  last BM:     : normal      MSK: normal   edema             ambulatory      Skin: normal   LABS:                        12.9   6.8   )-----------( 357      ( 08 May 2017 06:06 )             39.7     05-08    137  |  97<L>  |  21.0<H>  ----------------------------<  141<H>  4.0   |  25.0  |  0.63    Ca    9.2      08 May 2017 06:06  Phos  4.4     05-07  Mg     2.0     05-07    TPro  7.0  /  Alb  3.5  /  TBili  0.6  /  DBili  x   /  AST  11  /  ALT  8   /  AlkPhos  94  05-07    PT/INR - ( 07 May 2017 08:22 )   PT: 14.1 sec;   INR: 1.28 ratio             I&O's Summary  I & Os for 24h ending 08 May 2017 07:00  =============================================  IN: 500 ml / OUT: 3075 ml / NET: -2575 ml    I & Os for current day (as of 08 May 2017 12:27)  =============================================  IN: 260 ml / OUT: 700 ml / NET: -440 ml      RADIOLOGY & ADDITIONAL STUDIES:    ADVANCE DIRECTIVES:   DNR  NO  Completed on:                     MOLST  NO   Completed on:  Living Will  YES NO   Completed on:

## 2017-05-09 NOTE — PROGRESS NOTE ADULT - PROBLEM SELECTOR PLAN 2
On Eliquis as an outpatient. Stable. Would not change to coumadin. Patient with no bleeding events. Discussed with family.
Recent PET scan noted progression of disease.  oncology evaluation
hold eliquis   Cardiology consult  ECHO today

## 2017-05-09 NOTE — PROGRESS NOTE ADULT - SUBJECTIVE AND OBJECTIVE BOX
seen for malignant pleural effusions    feels well, no sob/linder.  ambulatory  no cp  ROS negative    MEDICATIONS  (STANDING):  digoxin     Tablet 0.25milliGRAM(s) Oral daily  potassium chloride    Tablet ER 10milliEquivalent(s) Oral daily  insulin lispro (HumaLOG) corrective regimen sliding scale  SubCutaneous three times a day before meals  dextrose 5%. 1000milliLiter(s) IV Continuous <Continuous>  dextrose 50% Injectable 12.5Gram(s) IV Push once  dextrose 50% Injectable 25Gram(s) IV Push once  dextrose 50% Injectable 25Gram(s) IV Push once  diltiazem   CD 360milliGRAM(s) Oral daily  multivitamin 1Tablet(s) Oral daily  enoxaparin Injectable 100milliGRAM(s) SubCutaneous every 12 hours  acetaminophen   Tablet. 650milliGRAM(s) Oral every 6 hours  lidocaine   Patch 2Patch Transdermal daily  furosemide   Injectable 20milliGRAM(s) IV Push once    MEDICATIONS  (PRN):  dextrose Gel 1Dose(s) Oral once PRN Blood Glucose LESS THAN 70 milliGRAM(s)/deciliter  glucagon  Injectable 1milliGRAM(s) IntraMuscular once PRN Glucose LESS THAN 70 milligrams/deciliter  levalbuterol Inhalation 0.63milliGRAM(s) Inhalation every 6 hours PRN sob and wheezing  ipratropium    for Nebulization 500MICROGram(s) Inhalation every 6 hours PRN Shortness of Breath and/or Wheezing  oxyCODONE IR 5milliGRAM(s) Oral every 4 hours PRN Moderate Pain (4 - 6)      Allergies    No Known Allergies    Intolerances    atorvastatin (Joint Pain; Muscle Pain)      Vital Signs Last 24 Hrs  T(C): 36.6, Max: 36.6 (05-08 @ 16:22)  T(F): 97.8, Max: 97.8 (05-08 @ 16:22)  HR: 83 (82 - 83)  BP: 120/80 (120/80 - 128/70)  BP(mean): --  RR: 18 (18 - 18)  SpO2: 92% (92% - 92%)    PHYSICAL EXAM:    GENERAL: NAD  CHEST/LUNG: Clear to percussion bilaterally  HEART: irreg irreg rate and rhythm; S1 S2; no murmurs noted  ABDOMEN: Soft, Nontender, Nondistended; Bowel sounds present  EXTREMITIES: trace pitting edema.  NERVOUS SYSTEM:  Alert & Oriented X3, nonfocal  PSYCH: normal mood, appropriate response.    LABS:                        13.4   8.0   )-----------( 384      ( 09 May 2017 06:19 )             40.9     05-09    135  |  95<L>  |  23.0<H>  ----------------------------<  150<H>  4.2   |  27.0  |  0.77    Ca    9.3      09 May 2017 06:19            CAPILLARY BLOOD GLUCOSE  151 (09 May 2017 12:00)  150 (09 May 2017 08:43)  136 (08 May 2017 22:45)  163 (08 May 2017 17:45)        RADIOLOGY & ADDITIONAL TESTS:

## 2017-05-09 NOTE — PROGRESS NOTE ADULT - PROBLEM SELECTOR PLAN 3
Insulin coverage, close monitoring of blood glucose levels. Metformin to be discontinued on admission.
maintain O2 SAT > 95%

## 2017-05-09 NOTE — CHART NOTE - NSCHARTNOTEFT_GEN_A_CORE
68 y/o male unresectable lung Ca with malignant effusions S/P bilateral   pigtails for drainage. Both drains removed> CXR w/o PTX  Will no longer follow, Please re consult if need arises

## 2017-05-09 NOTE — PROGRESS NOTE ADULT - SUBJECTIVE AND OBJECTIVE BOX
Patient's symptoms unlikely to be of cardiac etiology. CHest tubes removed. Increase lasix to 40 mg po. Patient to be discharged to home in am. Follow up in office on friday.

## 2017-05-09 NOTE — PROGRESS NOTE ADULT - PROBLEM SELECTOR PLAN 1
SOB due to pleural effusion. Unlikely to be secondary to CHF. BNP very low and no significant history of diastolic dysfunction. No evidence of pericardial effusion. Continue iv lasix for now. Consider changing to po 40 mg once a day in 24-48 hours.
s/p bilateral chest tubes  CT sx following
s/p chest tubes bilaterally  start oral lasix in am.
s/p right chest and plan for left chest tube.  CT sx following---will send fluid studies
CT confirm b/l effusions  will ultrasound and place chest tubes if needed

## 2017-05-09 NOTE — PROGRESS NOTE ADULT - PROBLEM SELECTOR PROBLEM 1
Malignant pleural effusion
Pleural effusion
Pleural effusion
SOB (shortness of breath)
Pleural effusion

## 2017-05-10 NOTE — DISCHARGE NOTE ADULT - OTHER SIGNIFICANT FINDINGS
echo:  1. Technically fair study.   2. Normal global left ventricular systolic function.   3. Left ventricular ejection fraction, by visual estimation, is 55 to   60%.   4. Mid and apical inferior septum is abnormal as described above.   5. Thickening of the anterior and posterior mitral valve leaflets.   6. Sclerotic aortic valve with normal opening.   7. There is mild aortic root calcification.   8. Trace mitral valve regurgitation.   9. Estimated pulmonary artery systolic pressure is 35.0 mmHg assuming a   right atrial pressure of 3 mmHg, which is consistent with borderline   pulmonary hypertension.  10. Moderate pleural effusion in the left lateral region.  11. Trivial pericardial effusion.

## 2017-05-10 NOTE — DISCHARGE NOTE ADULT - CARE PROVIDER_API CALL
Shlomo Van), Cardiovascular Disease; Internal Medicine; Interventional Cardiology  [address]  [place] [address]  Phone: [phone]  Fax: [phone]    Fred Myers), Thoracic Surgery  [address] [place] [address]  Phone: [phone]  Fax: [phone]    [facility]  Phone: (   )    -  Fax: (   )    -

## 2017-05-10 NOTE — DISCHARGE NOTE ADULT - PROVIDER TOKENS
TOKEN:'[id number]',TOKEN:'[id number]',FREE:[LAST:[mariscal],PHONE:[(   )    -],FAX:[(   )    -],ADDRESS:[[facility]]]

## 2017-05-10 NOTE — DISCHARGE NOTE ADULT - PLAN OF CARE
resolved after bilateral chest tubes.  fluid LDH 2700  LDH serum 200  total protein fluid 4.3.  total protein serum 7 continue low dose lasix.  if recurs, may need pleurodesis with cardiothoracic surgery improved with increased dose diltiazem.  follow up with cardiology home medications. follow up with oncology for further management. continue low dose lasix.  if recurs, may need pleurodesis or pleurex catheters with cardiothoracic surgery

## 2017-05-10 NOTE — PROGRESS NOTE ADULT - NSHPATTENDINGPLANDISCUSS_GEN_ALL_CORE
Patient, Palliative SW
patient
patient/ wife and daughter at bedside.  d/w CTSx NP
Wife at the bedside in detail.

## 2017-05-10 NOTE — DISCHARGE NOTE ADULT - PATIENT PORTAL LINK FT
“You can access the Follow[employer] Patient Portal, offered by [facility], by registering with the following website: [email or web address]”

## 2017-05-10 NOTE — DISCHARGE NOTE ADULT - MEDICATION SUMMARY - MEDICATIONS TO TAKE
I will START or STAY ON the medications listed below when I get home from the hospital:    Opdivo 10 mg/mL intravenous solution  -- 240 milligram(s) intravenous 2 times a month  -- Indication: For Lung cancer    dilTIAZem 360 mg/24 hours oral capsule, extended release  -- 1 cap(s) by mouth once a day  -- Indication: For Atrial fibrillation    digoxin 250 mcg (0.25 mg) oral tablet  -- 1 tab(s) by mouth once a day  -- Indication: For Atrial fibrillation    Eliquis 5 mg oral tablet  -- 1 tab(s) by mouth 2 times a day  -- Indication: For Atrial fibrillation    metFORMIN  -- 750 milligram(s) by mouth 2 times a day  -- Indication: For Diabetes    Lasix 40 mg oral tablet  -- 1 tab(s) by mouth once a day  -- Indication: For Malignant pleural effusion    potassium chloride 10 mEq oral tablet, extended release  -- 1 tab(s) by mouth once a day  -- Indication: For Supplement    Glucosamine Chondroitin Advanced oral tablet  -- 1500 milligram(s) by mouth once a day  -- Indication: For vitamin

## 2017-05-10 NOTE — DISCHARGE NOTE ADULT - MEDICATION SUMMARY - MEDICATIONS TO CHANGE
I will SWITCH the dose or number of times a day I take the medications listed below when I get home from the hospital:    Cardizem  mg/24 hours oral capsule, extended release  -- 1 cap(s) by mouth once a day  -- It is very important that you take or use this exactly as directed.  Do not skip doses or discontinue unless directed by your doctor.  Some non-prescription drugs may aggravate your condition.  Read all labels carefully.  If a warning appears, check with your doctor before taking.  Swallow whole.  Do not crush.

## 2017-05-10 NOTE — DISCHARGE NOTE ADULT - CARE PROVIDERS DIRECT ADDRESSES
[email or web address],DirectAddress_Unknown

## 2017-05-10 NOTE — PROGRESS NOTE ADULT - ATTENDING COMMENTS
COUNSELING:    Face to face meeting to discuss Advanced Care Planning - Time Spent ______ Minutes.  See goals of care note.    More than 50% time spent in counseling and coordinating care. _35_____ Minutes.     Thank you for the opportunity to assist with the care of this patient.   [facility] Consult [facility] [phone].
pt seen and examined.  Right sided CT, draining more than 2l  plan for left ct today  no pericardial effusion on echo  start lovenox full dose  D/W Ct surgery.  I reviewed a/p. Agree with a/p.
d/c in am.

## 2017-05-10 NOTE — DISCHARGE NOTE ADULT - CARE PLAN
Principal Discharge DX:	Malignant pleural effusion  Goal:	resolved after bilateral chest tubes.  fluid LDH 2700  LDH serum 200  total protein fluid 4.3.  total protein serum 7  Instructions for follow-up, activity and diet:	continue low dose lasix.  if recurs, may need pleurodesis with cardiothoracic surgery  Secondary Diagnosis:	Chronic atrial fibrillation  Instructions for follow-up, activity and diet:	improved with increased dose diltiazem.  follow up with cardiology  Secondary Diagnosis:	Diabetes  Instructions for follow-up, activity and diet:	home medications.  Secondary Diagnosis:	Lung cancer  Instructions for follow-up, activity and diet:	follow up with oncology for further management. Principal Discharge DX:	Malignant pleural effusion  Goal:	resolved after bilateral chest tubes.  fluid LDH 2700  LDH serum 200  total protein fluid 4.3.  total protein serum 7  Instructions for follow-up, activity and diet:	continue low dose lasix.  if recurs, may need pleurodesis or pleurex catheters with cardiothoracic surgery  Secondary Diagnosis:	Chronic atrial fibrillation  Instructions for follow-up, activity and diet:	improved with increased dose diltiazem.  follow up with cardiology  Secondary Diagnosis:	Diabetes  Instructions for follow-up, activity and diet:	home medications.  Secondary Diagnosis:	Lung cancer  Instructions for follow-up, activity and diet:	follow up with oncology for further management.

## 2017-05-10 NOTE — PROGRESS NOTE ADULT - SUBJECTIVE AND OBJECTIVE BOX
This note is being entered the day after the visit.  met with patient and wife and son at bedside to discuss hospitalization and goals of care going forward.   Mitul introduced      INTERVAL HPI/OVERNIGHT EVENTS: Mr. Brown was OOB/CH during the time of my visit yesterday. Denies SOB, and not using Nasal O2  at present. He denies pain, and is relieved that B/L CT's were removed earlier. Tolerated well.  CC:Concerned about 2-3+ pedal edema; not elevating his lower extremities while sitting OOB/Ch. Eager to talk about his discharge plan, wife and son  at his bedside.  Denies N/V/D/Constipation CP, Palpitations. Is anxious but has good attitude about continuing treatment at this time.    67y old  Male who presents with a chief complaint of Shortness of breath (06 May 2017 19:08)      Present Symptoms:   Dyspnea:  1 -2    Nausea/Vomiting:  No  Anxiety:  Yes   Depression:  No  Fatigue: Yes   Loss of appetite: Yes     Pain:   Denies            Character-            Duration-            Effect-            Factors-            Frequency-            Location-            Severity-    Review of Systems: Reviewed                        All others negative    MEDICATIONS  (STANDING):  digoxin     Tablet 0.25milliGRAM(s) Oral daily  potassium chloride    Tablet ER 10milliEquivalent(s) Oral daily  insulin lispro (HumaLOG) corrective regimen sliding scale  SubCutaneous three times a day before meals  dextrose 5%. 1000milliLiter(s) IV Continuous <Continuous>  dextrose 50% Injectable 12.5Gram(s) IV Push once  dextrose 50% Injectable 25Gram(s) IV Push once  dextrose 50% Injectable 25Gram(s) IV Push once  diltiazem   CD 360milliGRAM(s) Oral daily  multivitamin 1Tablet(s) Oral daily  enoxaparin Injectable 100milliGRAM(s) SubCutaneous every 12 hours  acetaminophen   Tablet. 650milliGRAM(s) Oral every 6 hours  lidocaine   Patch 2Patch Transdermal daily  furosemide    Tablet 40milliGRAM(s) Oral daily    MEDICATIONS  (PRN):  dextrose Gel 1Dose(s) Oral once PRN Blood Glucose LESS THAN 70 milliGRAM(s)/deciliter  glucagon  Injectable 1milliGRAM(s) IntraMuscular once PRN Glucose LESS THAN 70 milligrams/deciliter  levalbuterol Inhalation 0.63milliGRAM(s) Inhalation every 6 hours PRN sob and wheezing  ipratropium    for Nebulization 500MICROGram(s) Inhalation every 6 hours PRN Shortness of Breath and/or Wheezing  oxyCODONE IR 5milliGRAM(s) Oral every 4 hours PRN Moderate Pain (4 - 6)      PHYSICAL EXAM:    Vital Signs Last 24 Hrs  T(C): 36.9, Max: 36.9 (05-09 @ 21:38)  T(F): 98.4, Max: 98.4 (05-09 @ 21:38)  HR: 93 (71 - 93)  BP: 106/60 (106/60 - 118/62)  BP(mean): --  RR: 16 (14 - 16)  SpO2: 95% (95% - 97%)    General: alert  oriented x __3__                         HEENT: normal  h    Lungs: comfortable SOB on exertion    CV: normal      GI: normal                  constipation  last BM:     : normal      MSK: normal   edema             ambulatory      Skin: normal  _  no rash    LABS:                        12.3   7.7   )-----------( 366      ( 10 May 2017 05:07 )             37.5     05-10    135  |  96<L>  |  18.0  ----------------------------<  154<H>  4.2   |  26.0  |  0.60    Ca    8.9      10 May 2017 05:07          I&O's Summary    I & Os for current day (as of 10 May 2017 08:33)  =============================================  IN: 120 ml / OUT: 1625 ml / NET: -1505 ml      RADIOLOGY & ADDITIONAL STUDIES:    ADVANCE DIRECTIVES:   DNR  NO  Completed on:                     MOLST   NO   Completed on:  Living Will   NO   Completed on:

## 2017-05-10 NOTE — PROGRESS NOTE ADULT - ASSESSMENT
66 y/o M with stage 4 metastatic lung cancer b/l plural effusions, CHF, and Afib on eliquis
67M with a history of stage 4 lung cancer (s/p chemotherapy, now on opdivo) and prior pericardial effusion with dyspnea and bilateral pleural effusions.
Stage IV Lung Cancer  B/L Pleural Effusions- S/P Thoracentesis removal of CT's today  SOB on exertion  Peripheral Edema  Progression of Disease See Goals of Care

## 2017-05-12 PROBLEM — I48.91 UNSPECIFIED ATRIAL FIBRILLATION: Chronic | Status: ACTIVE | Noted: 2017-01-01

## 2017-05-12 PROBLEM — I31.3 PERICARDIAL EFFUSION (NONINFLAMMATORY): Chronic | Status: ACTIVE | Noted: 2017-01-01

## 2017-05-12 PROBLEM — R06.02 EXERTIONAL SHORTNESS OF BREATH: Status: ACTIVE | Noted: 2017-01-01

## 2017-05-24 NOTE — CONSULT NOTE ADULT - SUBJECTIVE AND OBJECTIVE BOX
History of Present Illness:  67y Male    HPI: 67 year old male with PMH atrial fibrillation (on eliquis), pericardial effusion (drained 2 weeks ago), HTN, HLD, DM, lung mass. Pt d/c'd from [facility] approx 2 wks ago after pericardialcentesis and pigtail drainage of right effusion. Now with progressively worsening SOB since Sunday. Came to ED early this am after significant SOB while walking to bathroom. Unable to lie flat. Denies fever/chills, CP, sick contacts, cough.      Relevant Family History  FAMILY HISTORY:  No pertinent family history in first degree relatives    Allergies: atorvastatin (Joint Pain; Muscle Pain)  No Known Allergies                                                            LABS:    05-24    136  |  98  |  26.0<H>  ----------------------------<  144<H>  4.5   |  26.0  |  0.65    Ca    8.8      24 May 2017 03:08    5/24 Chest CTA  EXAM:  CT ANGIO CHEST (W)AW IC                          *** ADDENDUM 05/24/2017  ***    ADDENDUM:  On coronal images, the mass may have slightly increased in size measuring 4.0 cm in superior-inferior dimension, previously 3.8 cm  *** END OF ADDENDUM 05/24/2017  ***    PROCEDURE DATE:  05/24/2017      INTERPRETATION:  5/24/2017 4:26 AM    CLINICAL INFORMATION: Lung cancer, shortness of breath    COMPARISON: Chest CT 5/6/2017    PROCEDURE:   CT Angiography of the Chest.  86 ml of Omnipaque 350 was injected intravenously.   Sagittal and coronal reformats were performed as well as MIPS.    FINDINGS:    CHEST:     LUNGS/PLEURA: There is a relatively similar size of the dominant right upper lobe mass measuring 6.7 x 6.1 cm compared to the prior exam of 5/6/2017. There remains a large right pleural effusion with some fluid now tracking along the right major fissure. There is a right lower lobe atelectasis. Moderate left pleural effusion also again noted. Mild lingular and left lower lobe atelectasis again noted.  VESSELS: No definite pulmonary embolism. Evaluation of distal segmental and subsegmental branches is limited due to motion.  HEART: Borderline heart size. There is coronary atherosclerosis. No pericardial effusion.  MEDIASTINUM AND ALFRED: Stable mediastinal and right hilar lymphadenopathy  CHEST WALL AND LOWER NECK: Within normal limits.  VISUALIZED UPPER ABDOMEN: Bilateral adrenal gland thickening again noted  BONES: Degenerative changes    IMPRESSION:  No definite pulmonary embolism  Relatively similar size of the right upper lobe lung mass compared to 5/6/2017  There remains a large right pleural effusion. Some fluid has shifted into the right major fissure  There also remains a moderate left pleural effusion  ***Please see the addendum at the top of this report. It may contain additional important information or changes.****      Review of Systems         Constitutional: denies fever, chills, general malaise, weight loss, weight gain, diaphoresis   HEENT: denies dry mouth, sore throat, runny nose, photophobia, blurry vision, double vision, glasses, discharge, eye pain, difficulty hearing, vertigo, dysphagia, epistaxis, recent dental work    Respiratory: + SOB/ANDRES. denies cough, phlegm, wheezing, hemoptysis  Cardiovascular: denies CP, palpitations, edema, diaphoresis   Gastrointestinal: denies nausea, vomiting, diarrhea, constipation, abdominal pain, melena   Genitourinary: denies dysuria, frequency, urgency, incontinence, hematuria   Skin/Breast: denies rash, hives, itching, masses, hair loss   Musculoskeletal: denies myalgias, arthritis, joint swelling, muscle weakness  Neurologic: denies syncope, LOC, headache, weakness, dizziness, parasthesias, numbness, seizures, confusion, dementia   Psychiatric: denies feeling anxious, depressed, suicidal, homicidal  Endocrine: denies increased fingerstick glucoses, cold or heat intolerance, polydipsia, polyuria, polyphagia   Hematology/Oncology: denies bruising, tender or enlarged lymph nodes   ROS negative x 10 systems except as noted above    T(C): 36.3, Max: 36.3 (05-24 @ 01:27)  HR: 100 (100 - 100)  BP: 138/76 (138/76 - 138/76)  RR: 18 (18 - 18)  SpO2: 93% (93% - 93%) on room air      Physical Exam  General: WD, WN, NAD                                                         Neuro: A+O x 3, non-focal, speech clear and intact in paragraphs                     Eyes: PERRL, EOMI   ENT: Normal exam of nasal/oral mucosa with absence of cyanosis.   Neck: supple, no JVD, trachea midline   Chest: dimisnished right mid to base and left base but CTA, no wheezes/rales/rhonchi, normal excursion, no accessory muscle use note  CV: irregularly irregular, +S1S2  GI: soft, NT, ND, +BS, no organomegaly noted  Extremities: WILDER x 4, 1+ BLE pitting edema, distal motor/neuro/circ intact  SKIN: warm, dry, intact

## 2017-05-24 NOTE — PROGRESS NOTE ADULT - SUBJECTIVE AND OBJECTIVE BOX
CC: shortness of breath.     INTERVAL HISTORY: Recurrent pleural effusion.     MEDICATIONS  (STANDING):  digoxin     Tablet 0.25milliGRAM(s) Oral daily  diltiazem   CD 360milliGRAM(s) Oral daily  furosemide    Tablet 40milliGRAM(s) Oral daily  potassium chloride    Tablet ER 10milliEquivalent(s) Oral daily    ROS: All others negative     PHYSICAL EXAM:  T(C): 36.3, Max: 36.3 (05-24 @ 01:27)  HR: 94 (94 - 100)  BP: 121/69 (121/69 - 138/76)  RR: 20 (18 - 20)  SpO2: 92% (92% - 93%)  Wt(kg): --  I&O's Summary      Appearance: Normal	  HEENT:   Normal oral mucosa, PERRL, EOMI	  Lymphatic: No lymphadenopathy  Cardiovascular: Normal S1 S2, No JVD, No murmurs, No edema  Respiratory: Decreased breath sounds.   Psychiatry: A & O x 3, Mood & affect appropriate  Gastrointestinal:  Soft, Non-tender, + BS	  Skin: No rashes, No ecchymoses, No cyanosis  Neurologic: Non-focal  Extremities: Normal range of motion, No clubbing, cyanosis or edema  Vascular: Peripheral pulses palpable 2+ bilaterally	      LABS:	 	                        12.8   8.4   )-----------( 450      ( 24 May 2017 06:41 )             39.7     05-24    136  |  98  |  26.0<H>  ----------------------------<  144<H>  4.5   |  26.0  |  0.65    Ca    8.8      24 May 2017 03:08

## 2017-05-24 NOTE — PROGRESS NOTE ADULT - SUBJECTIVE AND OBJECTIVE BOX
Patient is a 67y old  Male who presents with a chief complaint of SOB (24 May 2017 13:47)  He is scheduled for a FLEXIBLE BRONCHOSCOPY, RIGHT VIDEO ASSISTED   THORACOSCOPIC LUNG RESECTION WITH POSSIBLE PLEURODIESIS.    PAST MEDICAL HISTORY:  Lung cancer dx July 1, 2016  Atrial fibrillation  Pericardial effusion  HTN (hypertension)  Hypercholesteremia  Diabetes      PAST SURGICAL HISTORY:  Fusion of his right wrist      MEDICATIONS  (STANDING):  digoxin     Tablet 0.25milliGRAM(s) Oral daily  diltiazem   CD 360milliGRAM(s) Oral daily  furosemide    Tablet 40milliGRAM(s) Oral daily  potassium chloride    Tablet ER 10milliEquivalent(s) Oral daily    MEDICATIONS  (PRN):  ALBUTerol/ipratropium for Nebulization 3milliLiter(s) Nebulizer every 6 hours PRN Bronchospasm  guaiFENesin    Syrup 100milliGRAM(s) Oral every 6 hours PRN Cough  acetaminophen   Tablet. 650milliGRAM(s) Oral every 6 hours PRN pain      Allergies: No Known drug allergies       SOCIAL HISTORY:  The patient drinks alcohol occasionally and has never used illicit drugs,                                He quit smoking 2 years ago after smoking 1 ppd x 30 years.                          12.8   8.4   )-----------( 450      ( 24 May 2017 06:41 )             39.7       PT/INR - ( 24 May 2017 06:41 )   PT: 13.8 sec;   INR: 1.25 ratio         PTT - ( 24 May 2017 06:41 )  PTT:38.4 sec    05-24    136  |  98  |  26.0<H>  ----------------------------<  144<H>  4.5   |  26.0  |  0.65    Ca    8.8      24 May 2017 03:08    Height (cm): 180.3 (05-24 @ 01:27)  Weight (kg): 104.3 (05-24 @ 01:27)  BMI (kg/m2): 32.1 (05-24 @ 01:27)    EKG: Atrial fibrillation  Low voltage QRS  Cannot rule out Anteriorinfarct , age undetermined  non specific ST wave abnormality  Abnormal ECG    TT ECHO:  ECHO TRANSTHORACIC --  May  7 2017      Summary:   2. Normal global left ventricular systolic function.   3. Left ventricular ejection fraction, by visual estimation, is 55 to        60%.   4. Mid and apical inferior septum is abnormal as described above.   5. Thickening of the anterior and posterior mitral valve leaflets.   6. Sclerotic aortic valve with normal opening.   7. There is mild aortic root calcification.   8. Trace mitral valve regurgitation.   9. Estimated pulmonary artery systolic pressure is 35.0 mmHg assuming a   right atrial pressure of 3 mmHg, which is consistent with borderline   pulmonary hypertension.  10. Moderate pleural effusion in the left lateral region.    CXR CHEST SINGLE VIEW FRONTAL  -  05/09/2017     FINDINGS: Left lung clear. No pneumothorax. Right lung shows persistent   right upper lobe multifocal mass. [id number] catheter tip in SVC.  IMPRESSION:   Left lung clear following removal of left chest tube. No   pneumothorax. Otherwise no change    CT ANGIO CHEST  -  05/24/2017   LUNGS/PLEURA: There is a relatively similar size of the dominant right   upper lobe mass measuring 6.7 x 6.1 cm compared to the prior exam of   5/6/2017. There remains a large right pleural effusion with some fluid   now tracking along the right major fissure. There is a right lower lobe   atelectasis. Moderate left pleural effusion also again noted. Mild   lingular and left lower lobe atelectasis again noted.    ASA # =  4   Mallampati # =  2

## 2017-05-24 NOTE — CONSULT NOTE ADULT - ASSESSMENT
67 year old male with PMH atrial fibrillation (on eliquis), pericardial effusion (drained 2 weeks ago), HTN, HLD, DM, lung mass. Pt d/c'd from [facility] approx 2 wks ago after pericardialcentesis and pigtail drainage of right effusion. Now with progressively worsening SOB since Sunday. Came to ED early this am after significant SOB while walking to bathroom. Unable to lie flat. Large right pleural effusion on chest CTA. Increased size of lung mass since last scan two weeks ago. No pericardial effusion or PE.

## 2017-05-24 NOTE — H&P ADULT - HISTORY OF PRESENT ILLNESS
This is 67M with PMH of Advance lung Cancer with recurrent pleural effusion who was recently discharge from hospital due to SOB due to pleural effusion presented with dyspnea. As per patient symptoms started last night, worse with activity, associated with mild cough. CT chest showed  No definite pulmonary embolism Relatively similar size of the right upper lobe lung mass compared to  5/6/2017 There remains a large right pleural effusion. Some fluid has shifted into  the right major fissure. There also remains a moderate left pleural effusion    During last admission about 2 weeks ago, he  underwent bilateral chest tube placement by CT surgery, improved respiratory status with chest tube subsequently removed. . Seen by oncology with outpatient follow up planned. Pleural effusion is exudative, likely malignant.    Prior records showed that he was  admitted in  last July for pericardial effusion requiring pericardiocentesis.    He denied chest pain, fever, chills, abd. pain, nausea and vomiting.

## 2017-05-24 NOTE — ED ADULT TRIAGE NOTE - CHIEF COMPLAINT QUOTE
patient states that he has difficulty breathing when he lays down and walks, states that when he sits up he feels better. Patient denies any difficulty breathing at this time. He states that approx 2 weeks ago he had fluid drained from lungs, patient with hx of lung ca

## 2017-05-24 NOTE — ED ADULT NURSE REASSESSMENT NOTE - NS ED NURSE REASSESS COMMENT FT1
Assumed pt care at this time. Pt A & ox3, respirations are even & unlabored at this time. Pt sitting in chair, waiting for admission orders, pt aware of plan of care. Assumed pt care at this time. Pt A & ox3, respirations are even & unlabored at this time, pt states as long as he is sitting and not walking/moving he feels able to breathe. Pt c/o ANDRES. . Pt sitting in chair, waiting for admission orders, pt aware of plan of care.

## 2017-05-24 NOTE — ED PROVIDER NOTE - OBJECTIVE STATEMENT
66 y/o M pt with PMHx of HTN, AFib, DM, pericardial effusion, lung CA and hypercholesteremia presents to ED c/o ANDRES x2-3 days. Pt reports at 1200 he went to use the men's room and was not able to catch his breath. Pt state that pain was alleviated with sitting up and pain worsens at night. He does not currently have any sx. Pt reports he had a pericardiocentesis performed ~2 weeks ago. He currently takes Eliquis daily. He was taking Lasix, but as per wife, it was not helping with his leg swelling and he was recently put on Prednisone. His last chemo/radiation treatment was 2 weeks ago. Pt is a former tobacco user and has not smoked a cigarette in 2 years. Pt denies CP, abdominal pain, nausea, vomiting, and back pain. No further complaints at this time. Allergy to atorvastatin

## 2017-05-24 NOTE — H&P ADULT - NSHPSOCIALHISTORY_GEN_ALL_CORE
ICU Vital Signs Last 24 Hrs  T(C): 36.3, Max: 36.3 (05-24 @ 01:27)  T(F): 97.3, Max: 97.4 (05-24 @ 01:27)  HR: 94 (94 - 100)  BP: 121/69 (121/69 - 138/76)  BP(mean): --  ABP: --  ABP(mean): --  RR: 20 (18 - 20)  SpO2: 92% (92% - 93%)

## 2017-05-24 NOTE — ED PROVIDER NOTE - MEDICAL DECISION MAKING DETAILS
Check CXR and reevaluate. Check CXR and reevaluate. pt with pleural eff but concern for possible pe secondary to cancer will order ct angio Check CXR and reevaluate. pt with pleural eff but concern for possible pe secondary to cancer will order ct angio  pt with pleural eff needs admission for ct placement

## 2017-05-24 NOTE — ED ADULT NURSE REASSESSMENT NOTE - GENERAL PATIENT STATE
comfortable appearance/family/SO at bedside
family/SO at bedside/comfortable appearance

## 2017-05-24 NOTE — CONSULT NOTE ADULT - ATTENDING COMMENTS
Pt seen and imaging reviewed.  Advanced lung ca and recurrent pleural effusion.  Will plan for VATS, pleurodesis tomorrow.  Hold eliquis

## 2017-05-24 NOTE — ED ADULT NURSE NOTE - OBJECTIVE STATEMENT
Patient A&Ox4, denies any pain or discomfort, Patient A&Ox4, denies any pain or discomfort, patient stated he has been experiencing shortness of breath at night the past 4 days. Stated he awakens from sleep & is short of breath. Patient diagnosed with lung cancer of right lung in 7/2016. Denies any shortness of breath at this time. Respirations even & unlabored, lung sounds clear bilat, denies any chest pain, dizziness, nausea or numbness/tingling. Will continue to monitor.

## 2017-05-24 NOTE — PROGRESS NOTE ADULT - SUBJECTIVE AND OBJECTIVE BOX
Thoracic Surgery Pre-op Note:                                                                                                                   Surgeon: Kesha    Procedure: VATS    Allergies:    No Known Allergies    Intolerances    atorvastatin (Joint Pain; Muscle Pain)      HPI:  This is 67M with PMH of Advance lung Cancer with recurrent pleural effusion who was recently discharge from hospital due to SOB due to pleural effusion presented with dyspnea. As per patient symptoms started last night, worse with activity, associated with mild cough. CT chest showed  No definite pulmonary embolism Relatively similar size of the right upper lobe lung mass compared to  5/6/2017 There remains a large right pleural effusion. Some fluid has shifted into  the right major fissure. There also remains a moderate left pleural effusion.    During last admission about 2 weeks ago, he  underwent bilateral chest tube placement by CT surgery, improved respiratory status with chest tube subsequently removed. . Seen by oncology with outpatient follow up planned. Pleural effusion is exudative, likely malignant.    Prior records showed that he was  admitted in  last July for pericardial effusion requiring pericardiocentesis.      PAST MEDICAL & SURGICAL HISTORY:  Atrial fibrillation  Pericardial effusion  HTN (hypertension)  Hypercholesteremia  Diabetes  No significant past surgical history      MEDICATIONS  (STANDING):  digoxin     Tablet 0.25milliGRAM(s) Oral daily  diltiazem   CD 360milliGRAM(s) Oral daily  furosemide    Tablet 40milliGRAM(s) Oral daily  potassium chloride    Tablet ER 10milliEquivalent(s) Oral daily    MEDICATIONS  (PRN):  ALBUTerol/ipratropium for Nebulization 3milliLiter(s) Nebulizer every 6 hours PRN Bronchospasm  guaiFENesin    Syrup 100milliGRAM(s) Oral every 6 hours PRN Cough  acetaminophen   Tablet. 650milliGRAM(s) Oral every 6 hours PRN pain        Labs:                        12.8   8.4   )-----------( 450      ( 24 May 2017 06:41 )             39.7     05-24    136  |  98  |  26.0<H>  ----------------------------<  144<H>  4.5   |  26.0  |  0.65    Ca    8.8      24 May 2017 03:08      PT/INR - ( 24 May 2017 06:41 )   PT: 13.8 sec;   INR: 1.25 ratio         PTT - ( 24 May 2017 06:41 )  PTT:38.4 sec        CXR: Findings:    Unchanged right upper lung mass lesion, unchanged right Port-A-Cath   catheter tip in superior vena cava. Bilateral pleural effusion, increased   since the prior study of May 9, 2017. Cardiac size and configuration   appear normal. Iveth appear normal. Trachea is midline. No pneumothorax.   Bones appearnormal.    Impression:    Unchanged right upper lung mass.    Increasing bilateral pleural fluid.    No pneumothorax.      TYRONE BECKER M.D., ATTENDING RADIOLOGIST  This document has been electronically signed. May 24 2017  9:02AM        EKG: pending    CT chest:  IMPRESSION:    No definite pulmonary embolism    Relatively similar size of the right upper lobe lung mass compared to   5/6/2017    There remains a large right pleural effusion. Some fluid has shifted into   the right major fissure    There also remains a moderate left pleural effusion        ***Please see the addendum at the top of this report. It may contain   additional important information or changes.****  MARGE ALMEIDA M.D., ATTENDING RADIOLOGIST          Pt has AICD/PPm [ ] Yes  [x ] No              Type & Cross  [ x] Yes  [ ] No  NPO after Midnight [x ] Yes  [ ] No  Consent obtained  [ ] Yes  [x ] No

## 2017-05-24 NOTE — CONSULT NOTE ADULT - PROBLEM SELECTOR RECOMMENDATION 9
admit to medicine  will place right pigtail then left if needed  Dr Rebolledo will see pt this morning admit to medicine  will place right pigtail then left if needed (need to hold eliquis)  Dr Rebolledo will see pt this morning

## 2017-05-24 NOTE — H&P ADULT - ASSESSMENT
This is 67M with PMH of Advance lung Cancer with recurrent pleural effusion who was recently discharge from hospital due to SOB due to pleural effusion presented with dyspnea. As per patient symptoms started last night, worse with activity, associated with mild cough. CT chest showed  No definite pulmonary embolism Relatively similar size of the right upper lobe lung mass compared to  5/6/2017 There remains a large right pleural effusion. Some fluid has shifted into  the right major fissure. There also remains a moderate left pleural effusion.    A/P    >Advance lung cancer with recurrent pleural effusion   admit to 4T  Ct surgery consult appreciated, discussed with Dr. Rebolledo, pt may go for VATS in am   hold Eliquis for now, last dose of eliquis was yesterday, Dr. Rebolledo aware   medically optimized for VATS   cardiology consult requested - Dr. Van     >Lung Cancer - Dr. Castro- f/u as an outpatient  on [facility] 2 times a month     >DM- Hold Metformin  FS plus sliding scale     >A.fib - hold eliqius for now as pt will be going for VATS  cont. cardizem 360    >HTN- stable, cont. cardizem

## 2017-05-24 NOTE — ED ADULT NURSE REASSESSMENT NOTE - NS ED NURSE REASSESS COMMENT FT1
Pt sitting in chair next to stretcher, pt states he does not want to sit in bed, waiting for room assignment, explained to pt he could be in ED for another day, pt still states he would be ok in chair... Family at bedside

## 2017-05-24 NOTE — ED ADULT NURSE REASSESSMENT NOTE - NS ED NURSE REASSESS COMMENT FT1
Assume pt care @ 1900 from OLRENA Serrano. Pt is A&Ox3 in NAD on 3L NC, Afib on cardiac monitor. Pt denies complaint.  IV clean dry and intact, flushes without difficulty. Pt has right chest port for chemotherapy. Pt OOB independently. Safety maintained, Bed locked in lowest position. Call bell in reach. Pt awaiting bed placement. Will continue to monitor.

## 2017-05-24 NOTE — ED PROVIDER NOTE - CARE PLAN
Principal Discharge DX:	Shortness of breath Principal Discharge DX:	Shortness of breath  Secondary Diagnosis:	Pleural effusion

## 2017-05-25 NOTE — PROGRESS NOTE ADULT - SUBJECTIVE AND OBJECTIVE BOX
Internal Medicine Hospitalist - Dr. Baron LAGOS    [id number]    67y      Male    Patient is a 67y old  Male who presents with a chief complaint of SOB (24 May 2017 13:47)    HPI:  This is 67M with PMH of Advance lung Cancer with recurrent pleural effusion who was recently discharge from hospital due to SOB due to pleural effusion presented with dyspnea. As per patient symptoms started last night, worse with activity, associated with mild cough. CT chest showed  No definite pulmonary embolism Relatively similar size of the right upper lobe lung mass compared to  5/6/2017 There remains a large right pleural effusion. Some fluid has shifted into  the right major fissure. There also remains a moderate left pleural effusion    During last admission about 2 weeks ago, he  underwent bilateral chest tube placement by CT surgery, improved respiratory status with chest tube subsequently removed. . Seen by oncology with outpatient follow up planned. Pleural effusion is exudative, likely malignant.    Prior records showed that he was  admitted in  last July for pericardial effusion requiring pericardiocentesis.    He denied chest pain, fever, chills, abd. pain, nausea and vomiting. (24 May 2017 13:47)      INTERVAL HPI/ OVERNIGHT EVENTS: Patient is seen and examined, schedule for VATS today, report mild SOB     REVIEW OF SYSTEMS:    Denied fever, chills, abd. pain, nausea, vomiting, chest pain, headache, dizziness    PHYSICAL EXAM:    Vital Signs Last 24 Hrs  T(C): 36.7, Max: 37 (05-24 @ 19:48)  T(F): 98, Max: 98.6 (05-24 @ 19:48)  HR: 92 (82 - 100)  BP: 126/62 (108/64 - 138/79)  BP(mean): --  RR: 18 (17 - 22)  SpO2: 92% (90% - 93%)    GENERAL: NAD  HEENT: EOMI  Neck: supple  CHEST/LUNG: decrease breath sounds over bases   HEART: S1S2+ audible  ABDOMEN: Soft, Nontender, Nondistended; Bowel sounds present  EXTREMITIES:  positive edema  CNS: AAO X 3  Psychiatry: normal mood    LABS:                        12.7   8.3   )-----------( 406      ( 25 May 2017 05:14 )             39.7     05-25    136  |  99  |  23.0<H>  ----------------------------<  146<H>  4.3   |  26.0  |  0.60    Ca    8.7      25 May 2017 05:14      PT/INR - ( 24 May 2017 06:41 )   PT: 13.8 sec;   INR: 1.25 ratio         PTT - ( 24 May 2017 06:41 )  PTT:38.4 sec        MEDICATIONS  (STANDING):  heparin  Injectable 5000Unit(s) SubCutaneous every 8 hours  lactated ringers. 500milliLiter(s) IV Continuous <Continuous>  docusate sodium 100milliGRAM(s) Oral three times a day  ALBUTerol/ipratropium for Nebulization 3milliLiter(s) Nebulizer every 6 hours  acetaminophen  IVPB. 1000milliGRAM(s) IV Intermittent once  digoxin     Tablet 0.25milliGRAM(s) Oral daily  diltiazem   CD 360milliGRAM(s) Oral daily  furosemide    Tablet 40milliGRAM(s) Oral daily  potassium chloride    Tablet ER 10milliEquivalent(s) Oral daily    MEDICATIONS  (PRN):  senna 2Tablet(s) Oral at bedtime PRN Constipation  morphine  - Injectable 3milliGRAM(s) IV Push every 15 minutes PRN Moderate Pain (4 - 6)  guaiFENesin    Syrup 100milliGRAM(s) Oral every 6 hours PRN Cough      RADIOLOGY & ADDITIONAL TEST

## 2017-05-25 NOTE — PROGRESS NOTE ADULT - ASSESSMENT
This is 67M with PMH of Advance lung Cancer with recurrent pleural effusion who was recently discharge from hospital due to SOB due to pleural effusion presented with dyspnea. As per patient symptoms started last night, worse with activity, associated with mild cough. CT chest showed  No definite pulmonary embolism Relatively similar size of the right upper lobe lung mass compared to  5/6/2017 There remains a large right pleural effusion. Some fluid has shifted into  the right major fissure. There also remains a moderate left pleural effusion.    A/P    >Advance lung cancer with recurrent pleural effusion   Ct surgery consult appreciated, discussed with Dr. Rebolledo, schedule for VATS today   hold Eliquis for now  cardiology consult requested - Dr. Van     >Lung Cancer - Dr. Castro- f/u as an outpatient  on Opdivo 2 times a month   oncology consulted     >DM- Hold Metformin  FS plus sliding scale     >A.fib - hold eliqius for now as pt will be going for VATS  cont. cardizem 360    >HTN- stable, cont. cardizem

## 2017-05-26 NOTE — PROGRESS NOTE ADULT - SUBJECTIVE AND OBJECTIVE BOX
pt pod 1 sp VATS under GETA. Tolerated well. Denies any A/c.   pt with no n/v @ this time. using pain meds as ordered/needed with some pain relief. pain service team on patient case. vss. spont vent. no issues with anesthesia at this time. pt resting comfortably @ this time.

## 2017-05-26 NOTE — PROGRESS NOTE ADULT - ASSESSMENT
66 y/o M witth stage 4 metastatic lung cancer s/p Rt VATS drainage of effusion Talc pleurodesis     Plan   Ambulate   Maintain CT today  DW Dr Rebolledo in am rounds

## 2017-05-26 NOTE — PROGRESS NOTE ADULT - SUBJECTIVE AND OBJECTIVE BOX
Chief Complaint: Incisional Pain    Patient seen and examined at bedside  Pain well controlled with current medication regimen, no adverse side effects  No new events overnight  Diet as tolerated      PAST MEDICAL & SURGICAL HISTORY:  Atrial fibrillation  Pericardial effusion  HTN (hypertension)  Hypercholesteremia  Diabetes  No significant past surgical history      SOCIAL HISTORY:  [ ] Denies Smoking, Alcohol, or Drug Use    Allergies    No Known Allergies    Intolerances    atorvastatin (Joint Pain; Muscle Pain)      PAIN MEDICATIONS:  oxyCODONE IR 5milliGRAM(s) Oral every 3 hours PRN  oxyCODONE IR 10milliGRAM(s) Oral every 3 hours PRN  HYDROmorphone  Injectable 0.5milliGRAM(s) IV Push every 4 hours PRN    Heme:  apixaban 5milliGRAM(s) Oral two times a day    Antibiotics:    Cardiac:  digoxin     Tablet 0.25milliGRAM(s) Oral daily  diltiazem   CD 360milliGRAM(s) Oral daily  furosemide    Tablet 40milliGRAM(s) Oral daily    Pulmonary:  guaiFENesin    Syrup 100milliGRAM(s) Oral every 6 hours PRN  levalbuterol Inhalation 0.63milliGRAM(s) Inhalation every 8 hours    Endocrine  insulin lispro (HumaLOG) corrective regimen sliding scale  SubCutaneous three times a day before meals  insulin lispro (HumaLOG) corrective regimen sliding scale  SubCutaneous at bedtime  dextrose Gel 1Dose(s) Oral once PRN  dextrose 50% Injectable 12.5Gram(s) IV Push once  dextrose 50% Injectable 25Gram(s) IV Push once  dextrose 50% Injectable 25Gram(s) IV Push once  glucagon  Injectable 1milliGRAM(s) IntraMuscular once PRN    GI:  docusate sodium 100milliGRAM(s) Oral three times a day  senna 2Tablet(s) Oral at bedtime PRN    All Other Meds:  lactated ringers. 500milliLiter(s) IV Continuous <Continuous>  potassium chloride    Tablet ER 10milliEquivalent(s) Oral daily  dextrose 5%. 1000milliLiter(s) IV Continuous <Continuous>      REVIEW OF SYSTEMS:  CONSTITUTIONAL: Negative fever, chills  NECK: No pain or stiffness  RESPIRATORY: No cough, wheezing,  No shortness of breath  GASTROINTESTINAL: No abdominal, No nausea, vomiting; No diarrhea or constipation.   SKIN: No itching, burning, rashes, or lesions   MUSCULOSKELETAL: +  joint pain; No muscle, back, or extremity pain    PHYSICAL EXAM:    Vital Signs Last 24 Hrs  T(C): 36.7, Max: 36.7 (05-25 @ 18:11)  T(F): 98.1, Max: 98.1 (05-26 @ 05:08)  HR: 122 (93 - 122)  BP: 104/665 (104/665 - 122/70)  BP(mean): --  RR: 20 (15 - 20)  SpO2: 86% (86% - 99%)    PAIN SCALE:  3-10     VNRS (Verbal Numerical Rating Scale) 1-10       GENERAL: Comfortable, in no apparent distress  HEENT:  Atraumatic, Normocephalic  NECK: Supple  LUNG: Respiration even and unlabored,  Right chest tube  ABDOMEN: Soft, Nontender, Nondistended  BACK: No midline bony tenderness to palpation, no step off or deformity noted.   EXTREMITIES:  WILDER X4, No clubbing, cyanosis, or edema  NEUROLOGIC: Awake, alert and oriented X3  SKIN: Warm and Dry    LABS:                          14.3   19.2  )-----------( 451      ( 26 May 2017 05:55 )             44.1     05-26    134<L>  |  96<L>  |  28.0<H>  ----------------------------<  172<H>  4.5   |  24.0  |  0.96    Ca    8.6      26 May 2017 05:55            Drug Screen:        RADIOLOGY:      [x ]  [place]  Reviewed and Copied to Chart

## 2017-05-26 NOTE — PROGRESS NOTE ADULT - SUBJECTIVE AND OBJECTIVE BOX
Subjective: "  I'm feeling just fine doc"  Pt in bed NAD     T(C): 36.7, Max: 36.7 (05-25 @ 18:11)  HR: 122 (81 - 122)  BP: 104/665 (104/665 - 127/65)  ABP: 133/65 (133/65 - 143/65)  RR: 20 (12 - 20)  SpO2: 86% (86% - 99%) desat   94% on 3L NC  Tele: Afib     CHEST TUBE:  Rt #1                               OUTPUT:   195cc   per 24 hours    AIR LEAKS:  [ ] YES [x ] NO                               #2                                                 120cc  per 24 hours   no air leaks       05-26    134<L>  |  96<L>  |  28.0<H>  ----------------------------<  172<H>  4.5   |  24.0  |  0.96    Ca    8.6      26 May 2017 05:55                                 14.3   19.2  )-----------( 451      ( 26 May 2017 05:55 )             44.1                 CAPILLARY BLOOD GLUCOSE  171 (26 May 2017 08:20)  158 (25 May 2017 21:03)  160 (25 May 2017 18:11)  173 (25 May 2017 14:30)           CXR:   Findings:  No change in right chest tubes. No change in right IJ central line.   Increasing pulmonary vascular congestion. Increasing right pleural   effusion.  New pleural effusion..    Impression:  Increasing congestive heart failure. No evidence pneumothorax..      EXAM  Neuro:  Alert awake no deficits   Card: Irreg rate S1 S2  Resp: decreased at base Rt > Lt  Abd:  Soft NT ND + BS  Ext :  Trace edema b/l LE  Inc:  Rt Thoracic:  C/D/I + 2 CT dressing C/D/I

## 2017-05-26 NOTE — PROGRESS NOTE ADULT - SUBJECTIVE AND OBJECTIVE BOX
CC: shortness of breath    INTERVAL HISTORY: Improved. s/p VATs and talc pleurodesis.     MEDICATIONS  (STANDING):  lactated ringers. 500milliLiter(s) IV Continuous <Continuous>  docusate sodium 100milliGRAM(s) Oral three times a day  digoxin     Tablet 0.25milliGRAM(s) Oral daily  diltiazem   CD 360milliGRAM(s) Oral daily  furosemide    Tablet 40milliGRAM(s) Oral daily  potassium chloride    Tablet ER 10milliEquivalent(s) Oral daily  insulin lispro (HumaLOG) corrective regimen sliding scale  SubCutaneous three times a day before meals  insulin lispro (HumaLOG) corrective regimen sliding scale  SubCutaneous at bedtime  dextrose 5%. 1000milliLiter(s) IV Continuous <Continuous>  dextrose 50% Injectable 12.5Gram(s) IV Push once  dextrose 50% Injectable 25Gram(s) IV Push once  dextrose 50% Injectable 25Gram(s) IV Push once  levalbuterol Inhalation 0.63milliGRAM(s) Inhalation every 8 hours  apixaban 5milliGRAM(s) Oral two times a day    ROS: All others negative     PHYSICAL EXAM:  T(C): 36.7, Max: 36.7 (05-25 @ 18:11)  HR: 84 (84 - 122)  BP: 112/66 (104/66 - 120/74)  RR: 20 (16 - 20)  SpO2: 96% (86% - 99%)  Wt(kg): --  I&O's Summary  I & Os for 24h ending 26 May 2017 07:00  =============================================  IN: 1550 ml / OUT: 566 ml / NET: 984 ml    I & Os for current day (as of 26 May 2017 15:35)  =============================================  IN: 1080 ml / OUT: 125 ml / NET: 955 ml      Appearance: Normal	  HEENT:   Normal oral mucosa, PERRL, EOMI	  Lymphatic: No lymphadenopathy  Cardiovascular: Irregular.   Respiratory: Decreased bilaterally.   Psychiatry: A & O x 3, Mood & affect appropriate  Gastrointestinal:  Soft, Non-tender, + BS	  Skin: No rashes, No ecchymoses, No cyanosis  Neurologic: Non-focal  Extremities: Normal range of motion, No clubbing, cyanosis or edema  Vascular: Peripheral pulses palpable 2+ bilaterally    TELEMETRY: 	afib rates 100-110.       LABS:	 	                        14.3   19.2  )-----------( 451      ( 26 May 2017 05:55 )             44.1     05-26    134<L>  |  96<L>  |  28.0<H>  ----------------------------<  172<H>  4.5   |  24.0  |  0.96    Ca    8.6      26 May 2017 05:55  Phos  4.7     05-26  Mg     2.0     05-26

## 2017-05-26 NOTE — DIETITIAN INITIAL EVALUATION ADULT. - OTHER INFO
Pt admitted with SOB and recurrent pleural effusions. Spoke to pt and daughter during encounter who both report no recent significant weight changes for pt other than slilght increases and decreases from fluid accumulation. Pt reports >75% po intake for most meals. Pt tolerating consisten cho (w/ evening snack) well. Explained to pt the purpose of his diet order which he demonstrated good understanding of. Pt reports that he "eats very healthy" and "exercises regularly." Pt requested list of foods with high purine content to refer to for his gout. No c/o of GI distress at this time. Pt admitted with SOB and recurrent pleural effusions. Spoke to pt and daughter during encounter who both report no recent significant weight changes for pt other than slight increases and decreases from fluid accumulation. Pt reports >75% po intake for most meals. Pt tolerating consistent cho (w/ evening snack) well. Explained to pt the purpose of his diet order which he demonstrated good understanding of. Pt reports that he "eats very healthy" and "exercises regularly." Pt requested list of foods with high purine content to refer to for his gout. No c/o of GI distress at this time. Pt admitted with SOB and recurrent pleural effusions. Spoke to pt and family friend during encounter who both report no recent significant weight changes for pt other than slight increases and decreases from fluid accumulation. Pt reports >75% po intake for most meals. Pt tolerating consistent cho (w/ evening snack) well. Explained to pt the purpose of his diet order which he demonstrated good understanding of. Pt reports that he "eats very healthy" and "exercises regularly." Pt requested list of foods with high purine content to refer to for his gout. No c/o of GI distress at this time.

## 2017-05-26 NOTE — DIETITIAN INITIAL EVALUATION ADULT. - NUTRITIONGOAL OUTCOME1
Pt will continue with good po intake of >75% of estimated energy-protein needs with no weight change

## 2017-05-26 NOTE — CHART NOTE - NSCHARTNOTEFT_GEN_A_CORE
Pt is s/p VATS yesterday, discussed with CT surgery, pt is transfer to CT surgery service, will sign off, please reconsult prn

## 2017-05-26 NOTE — DIETITIAN INITIAL EVALUATION ADULT. - SIGNS/SYMPTOMS
as evidenced by recurrent pleural effusions, hx of lung CA and chemo treatments as evidenced by pt reports of limited knowledge of high purine foods, con carb diet during interview

## 2017-05-26 NOTE — DIETITIAN INITIAL EVALUATION ADULT. - ETIOLOGY
related to increased physiological demand with stress factors related to lack of knowledge about foods with high purine content/carbohydrate controlled diet

## 2017-05-27 NOTE — PROGRESS NOTE ADULT - SUBJECTIVE AND OBJECTIVE BOX
Subjective: "  My oxygen level has gone down"  Pt in chair NAD, On 4 L NC     T(C): 36.4, Max: 36.8 (05-26 @ 15:00)  HR: 78 (78 - 112)  BP: 111/65 (106/52 - 134/67)    RR: 18 (17 - 20)  SpO2: 90% (85% - 96%) 4-5 L NC         86% RA at rest     Tele: Afib     CHEST TUBE: #1               OUTPUT:  110cc    per 24 hours    AIR LEAKS:  [ ] YES [x ] NO                          #2                                130cc   per 24 hours    AIR LEAKS   [  ] YES [X] NO      05-27    131<L>  |  92<L>  |  38.0<H>  ----------------------------<  135<H>  4.2   |  24.0  |  0.83    Ca    9.0      27 May 2017 07:43  Phos  4.7     05-26  Mg     2.0     05-26                                 12.5   14.2  )-----------( 405      ( 27 May 2017 07:43 )             38.3                 CAPILLARY BLOOD GLUCOSE  129 (27 May 2017 08:27)  160 (26 May 2017 22:28)  113 (26 May 2017 17:35)  216 (26 May 2017 12:59)           CXR:  Findings:  No change in right chest tubes. No change in right IJ central line.   Increasing pulmonary vascular congestion. Increasing right pleural   effusion.  New pleural effusion..    Impression:  Increasing congestive heart failure. No evidence pneumothorax..      EXAM  Neuro:  Alert awake no deficits   Card: Irreg rate S1 S2  Resp: decreased at base Rt > Lt  Abd:  Soft NT ND + BS  + BM 5/27    Ext :  Trace edema b/l LE  Inc:  Rt Thoracic:  C/D/I + 2 CT dressing C/D/I

## 2017-05-27 NOTE — PROGRESS NOTE ADULT - ASSESSMENT
66 y/o M witth stage 4 metastatic lung cancer s/p Rt VATS drainage of effusion Talc pleurodesis     Plan   Ambulate   D/C Ct # 1 today Maintain CT # 2   DW Dr Myers  in am rounds

## 2017-05-28 NOTE — PHYSICAL THERAPY INITIAL EVALUATION ADULT - ADDITIONAL COMMENTS
Pt. lives in a house with his wife.Pt. reports 1 step to enter and flight upstairs with rail, however, pt. reporting he can/is planning to stay on 1st floor upon d/c.  Pt. was independent with all functional skills PTA and owns a RW. Pt. reports his wife is at home and able to supervision/assist as needed.

## 2017-05-28 NOTE — PROGRESS NOTE ADULT - SUBJECTIVE AND OBJECTIVE BOX
Subjective: "Can I go home today?"  Pt. denies any SOB or chest pain, NAD noted.    Tele: A-Fib   Vital Signs Last 24 Hrs  T(C): 36.8, Max: 37.3 (05-27 @ 21:30)  T(F): 98.2, Max: 99.1 (05-27 @ 21:30)  HR: 85 (78 - 112)  BP: 124/70 (111/65 - 128/58)  RR: 16 (16 - 18)  SpO2: 95% (85% - 97%)                      05-28    133<L>  |  95<L>  |  39.0<H>  ----------------------------<  158<H>  4.4   |  25.0  |  0.76    Ca    8.6      28 May 2017 05:56  Phos  3.8     05-28  Mg     2.6     05-28                               12.4   11.3  )-----------( 429      ( 28 May 2017 05:56 )             38.6               CAPILLARY BLOOD GLUCOSE  171 (27 May 2017 21:30)  154 (27 May 2017 16:54)  233 (27 May 2017 12:10)            CXR:    I&O's Detail    I & Os for current day (as of 28 May 2017 08:43)  =============================================  IN:    Oral Fluid: 965 ml    IV PiggyBack: 50 ml    Total IN: 1015 ml  ---------------------------------------------  OUT:    Voided: 2325 ml    Chest Tube: 16 ml    Total OUT: 2341 ml  ---------------------------------------------  Total NET: -1326 ml      RIGHT CHEST TUBE:  [x] YES [ ] NO  OUTPUT:   16  per 24 hours    AIR LEAKS:  [ ] YES [x] NO      BOWEL MOVEMENT:  [ ] YES [x] NO      MEDICATIONS  (STANDING):  lactated ringers. 500milliLiter(s) IV Continuous <Continuous>  docusate sodium 100milliGRAM(s) Oral three times a day  digoxin     Tablet 0.25milliGRAM(s) Oral daily  diltiazem   CD 360milliGRAM(s) Oral daily  furosemide    Tablet 40milliGRAM(s) Oral daily  potassium chloride    Tablet ER 10milliEquivalent(s) Oral daily  insulin lispro (HumaLOG) corrective regimen sliding scale  SubCutaneous three times a day before meals  insulin lispro (HumaLOG) corrective regimen sliding scale  SubCutaneous at bedtime  dextrose 5%. 1000milliLiter(s) IV Continuous <Continuous>  dextrose 50% Injectable 12.5Gram(s) IV Push once  dextrose 50% Injectable 25Gram(s) IV Push once  dextrose 50% Injectable 25Gram(s) IV Push once  levalbuterol Inhalation 0.63milliGRAM(s) Inhalation every 8 hours  apixaban 5milliGRAM(s) Oral two times a day  sorbitol 70% Solution 30milliLiter(s) Oral once    MEDICATIONS  (PRN):  senna 2Tablet(s) Oral at bedtime PRN Constipation  guaiFENesin    Syrup 100milliGRAM(s) Oral every 6 hours PRN Cough  dextrose Gel 1Dose(s) Oral once PRN Blood Glucose LESS THAN 70 milliGRAM(s)/deciliter  glucagon  Injectable 1milliGRAM(s) IntraMuscular once PRN Glucose LESS THAN 70 milligrams/deciliter  oxyCODONE IR 5milliGRAM(s) Oral every 3 hours PRN Moderate Pain (4 - 6)  oxyCODONE IR 10milliGRAM(s) Oral every 3 hours PRN Severe Pain (7 - 10)      Physical Exam:  Neuro: A&Ox4, no focal deficit noted.  Pulm: Left clear, Right base diminished. Right chest tube to water seal, no air leak detected.  CV: Irregularly irregular, +S1, +S2.  Abd: soft, non-tender, non-distended, +BS, +BM 5/26/17.  Extremities: MAEx4, trace edema x2 BLE, +PP, - calf tenderness.  Incision(s): Right thoracotomy incision CDI with 4x4 dsg in place.            PAST MEDICAL & SURGICAL HISTORY:  Atrial fibrillation  Pericardial effusion  HTN (hypertension)  Hypercholesteremia  Diabetes  No significant past surgical history

## 2017-05-28 NOTE — DISCHARGE NOTE ADULT - ADDITIONAL INSTRUCTIONS
1. Daily Shower  2. Weight yourself daily and notify any weight gain greater than 2-3 pounds in 24 hours.  3. Regular diet - low fat, low cholesterol, no added salt.  4. Cleanse right thoracotomy incision daily while showering with warm water and mild soap, pat dry and maintain open to air.   DO NOT APPLY ANY LOTION, CREAMS, OR POWDERS TO INCISIONS, KEEP OPEN TO AIR  5. No driving until cleared by MD.   6. No heavy lifting nothing greater than 5 pounds until cleared by MD.   7. Call / Notify MD any fever greater than 101.0  8. Increase Activity as tolerated.  9. Utilize heart pillow to splint pillow

## 2017-05-28 NOTE — PHYSICAL THERAPY INITIAL EVALUATION ADULT - CRITERIA FOR SKILLED THERAPEUTIC INTERVENTIONS
functional limitations in following categories/rehab potential/impairments found/anticipated equipment needs at discharge/predicted duration of therapy intervention/risk reduction/prevention/therapy frequency/anticipated discharge recommendation

## 2017-05-28 NOTE — DISCHARGE NOTE ADULT - PATIENT PORTAL LINK FT
“You can access the [facility] Patient Portal, offered by [facility], by registering with the following website: [email or web address]”

## 2017-05-28 NOTE — PROGRESS NOTE ADULT - ASSESSMENT
This is 67 year old male with a PMH of advance lung cancer with recurrent pleural effusion who was recently discharge from hospital due to SOB due to pleural effusion presented with dyspnea. CT chest showed no definite pulmonary embolism, relatively similar size of the right upper lobe lung mass compared to  5/6/2017 There remains a large right pleural effusion. Some fluid has shifted into  the right major fissure. There also remains a moderate left pleural effusion.  During last admission about 2 weeks ago, he  underwent bilateral chest tube placement by CT surgery, improved respiratory status with chest tube subsequently removed.  Seen by oncology with outpatient follow up planned. Pleural effusion is exudative, likely malignant.  Prior records showed that he was  admitted in  last July for pericardial effusion requiring pericardiocentesis.  He is s/p right VATS, drainage of effusion, talc pleurodesis on 5/25.      Post-op complications/issues:  5/27 #1 Right chest tube D/C'd  5/28 #2 Right chest tube D/C'd

## 2017-05-28 NOTE — PHYSICAL THERAPY INITIAL EVALUATION ADULT - PERTINENT HX OF CURRENT PROBLEM, REHAB EVAL
This is 67M with PMH of Advance lung Cancer with recurrent pleural effusion who was recently discharge from hospital due to SOB due to pleural effusion presented with dyspnea. As per patient symptoms started last night, worse with activity, associated with mild cough. s/p right chest tube with removal

## 2017-05-28 NOTE — DISCHARGE NOTE ADULT - MEDICATION SUMMARY - MEDICATIONS TO TAKE
I will START or STAY ON the medications listed below when I get home from the hospital:    DME  -- Oxygen portable and concentrator with refill   O2 SAT 86% at rest on RA  O2 @ 4 L/min via NC continous 92%  DX:  Lung Ca  UNA 99 MTHS     -- Indication: For Hypoxia    oxyCODONE 5 mg oral tablet  -- 1 tab(s) by mouth every 6 hours, As Needed, Moderate Pain (4 - 6) MDD:4  -- Indication: For Pain management    Opdivo 10 mg/mL intravenous solution  -- 240 milligram(s) intravenous 2 times a month  -- Indication: For antineoplastics    dilTIAZem 360 mg/24 hours oral capsule, extended release  -- 1 cap(s) by mouth once a day  -- Indication: For antiarrhythmic    digoxin 250 mcg (0.25 mg) oral tablet  -- 1 tab(s) by mouth once a day  -- Indication: For antiarrhythmic    Eliquis 5 mg oral tablet  -- 1 tab(s) by mouth 2 times a day  -- Indication: For anticoagulants    metFORMIN  -- 750 milligram(s) by mouth 2 times a day  -- Indication: For antidiabetic    Crestor 20 mg oral tablet  -- 1 tab(s) by mouth once a day (at bedtime)  -- Indication: For antihyperlipidemic    Lasix 40 mg oral tablet  -- 1 tab(s) by mouth once a day  -- Indication: For Diuretic    guaiFENesin 100 mg/5 mL oral liquid  -- 5 milliliter(s) by mouth every 6 hours, As needed, Cough  -- Indication: For expectorants    docusate sodium 100 mg oral capsule  -- 1 cap(s) by mouth 3 times a day  -- Indication: For Laxatives    senna oral tablet  -- 2 tab(s) by mouth once a day (at bedtime), As needed, Constipation  -- Indication: For Laxatives    potassium chloride 10 mEq oral tablet, extended release  -- 1 tab(s) by mouth once a day  -- Indication: For mineral    Glucosamine Chondroitin Advanced oral tablet  -- 1500 milligram(s) by mouth once a day  -- Indication: For Herb

## 2017-05-28 NOTE — PROGRESS NOTE ADULT - PROBLEM SELECTOR PLAN 1
Plan for VATS by Dr. jensen.
Tachycardic. Most likely secondary to underlying pain/post procedure. Will hold off on making changes to meds at this time. Continue cardizem and digoxin. Can add amiodarone/beta blockers tomorrow if still is tachycardic.
s/p VATS  D/C chest tube #1
1- Patient is stable, pain is controlled  2-Continue current medication regimen as effective  3-Will sign off, reconsult as needed
s/p VATS  maintain both CT today  cont lasix daily with potassium
s/p right VATS & talc pleurodesis.  Continue Oxy PO PRN for pain management.  Encourage the use of incentive spirometry, cough & deep breathing exercises, increase ambulation, continue Duonebs.  D/C right chest tube, will follow up with chest xray.  Discharge to home today.  Discussed with Dr. Myers & CT surgery in am rounds.

## 2017-05-28 NOTE — PROGRESS NOTE ADULT - PROBLEM SELECTOR PLAN 3
Patient low risk for planned procedure. Should be off 3 doses of Eliquis for decreased bleeding risk. Can plan for procedure tomorrow afternoon considering last dose on 5/23/2017.
Continue Eliquis, digoxin, & cardizem as directed.
Lasix and potassium   ECHO today will follow up results

## 2017-05-28 NOTE — DISCHARGE NOTE ADULT - NS AS ACTIVITY OBS
No Heavy lifting/straining/Stairs allowed/Walking-Indoors allowed/Showering allowed/Sex allowed/Do not make important decisions/Do not drive or operate machinery/Walking-Outdoors allowed

## 2017-05-28 NOTE — PROGRESS NOTE ADULT - PROBLEM SELECTOR PLAN 5
Maintain low fat, low sodium diet.  Defer statin for now for history of allergy; will resume crestor when home.

## 2017-05-28 NOTE — PROGRESS NOTE ADULT - PROBLEM SELECTOR PLAN 2
Rate stable.
Cardizem and digoxin  restart eliquis today
Cardizem and digoxin  restart eliquis today
Continue Lasix & Potassium.

## 2017-05-28 NOTE — PROGRESS NOTE ADULT - PROBLEM SELECTOR PROBLEM 2
Chronic atrial fibrillation
Pleural effusion

## 2017-05-28 NOTE — DISCHARGE NOTE ADULT - INSTRUCTIONS
Maintain low fat, low sodium diet. Choose lean meats and poultry without skin and prepare them without added saturated and trans fat.  Eat fish at least twice a week. Recent research shows that eating oily fish containing omega-3 fatty acids (for example, salmon, trout and herring) may help lower your risk of death from coronary artery disease.  Select fat-free, 1 percent fat and low-fat dairy products.  Cut back on foods containing partially hydrogenated vegetable oils to reduce trans fat in your diet.   To lower cholesterol, reduce saturated fat to no more than 5 to 6 percent of total calories. For someone eating 2,000 calories a day, that’s about 13 grams of saturated fat.  Cut back on beverages and foods with added sugars.  Choose and prepare foods with little or no salt. To lower blood pressure, aim to eat no more than 2,400 milligrams of sodium per day. Reducing daily intake to 1,500 mg is desirable because it can lower blood pressure even further.  If you drink alcohol, drink in moderation. That means one drink per day if you’re a woman and two drinks  per day if you’re a man.  Follow the American Heart Association recommendations when you eat out, and keep an eye on your portion sizes.

## 2017-05-28 NOTE — DISCHARGE NOTE ADULT - CARE PROVIDER_API CALL
Darshan Rebolledo), Surgery; Thoracic Surgery  [address] [place] [id number]  Phone: [phone]  Fax: [phone]

## 2017-06-06 NOTE — PHYSICAL THERAPY INITIAL EVALUATION ADULT - THERAPY FREQUENCY, PT EVAL
Forms received from: [facility]   Phone number listed: [phone]   Fax listed: [phone]  Date received: 06/06/2016  Form description: detailed written order and letter of medical necessity  Once forms are completed, please return to [facility] via fax.  Form placed: in providers folder  Georgina Ibrahim     3-5x/week

## 2017-06-09 PROBLEM — C79.9 METASTATIC CANCER: Status: ACTIVE | Noted: 2017-01-01

## 2017-06-09 PROBLEM — J90 PLEURAL EFFUSION: Status: ACTIVE | Noted: 2017-01-01

## 2017-06-13 NOTE — H&P ADULT - PROBLEM SELECTOR PLAN 3
Potential IR drainage tomorrow.  Dr. Rebolledo to determine tomorrow.  CXR in AM.  IV Lasix x 1 tonight.

## 2017-06-13 NOTE — H&P ADULT - PROBLEM SELECTOR PLAN 6
Continue Home dose Metformin.  FS AC/HS with MYLA.  Consistent Carb diet.    Medicine consult called (Spoke with Dr. Coleman).  Discussed with Dr. Rebolledo.

## 2017-06-13 NOTE — H&P ADULT - PROBLEM SELECTOR PLAN 1
Currently Afib rate controlled.  Continue home dose digoxin.  Continue Cardizem immediate release while in hospital (take Cardizem CD at home).  Continuous cardiac monitoring.  Hold Home Eliquis for now with potential for IR procedure tomorrow.  Cardiology consult called (Dr. Bustamante> pt sees [facility] as outpatient).

## 2017-06-13 NOTE — H&P ADULT - PROBLEM SELECTOR PLAN 2
Continue Home dose Crestor.  Pt has intolerance to Zocor and Lipitor.  Spoke with pharmacist.  DASH/TLC diet.

## 2017-06-13 NOTE — H&P ADULT - HISTORY OF PRESENT ILLNESS
67 year old male with PMH Afib (on Eliquis last taken this AM), HTN, Stage IV Lung CA (On Opdivo currently.  Completed chemo 1 month ago), DM, Hyperlipidemia.  He follows with [facility] oncology in [place].  He recently underwent Right sided Pleurodesis for recurrent malignant effusion (5/28/17).  He was discharged home on home Oxygen 4 liters, that he was using 24 hours a day.  Since discharge, he reports progressively worsening dyspnea on exertion and orthopnea. He reports that this morning he was unable to even ambulate 10 feet without feeling out of breath.

## 2017-06-13 NOTE — H&P ADULT - ASSESSMENT
66yo male with PMH as above. S/P Right pleurodesis for recurrent malignant pleural effusion. Presented with increasing SOB and orthopnea over the last 2 weeks.

## 2017-06-13 NOTE — H&P ADULT - NSHPSOCIALHISTORY_GEN_ALL_CORE
Denies use of alcohol or illicit drugs.  Reports history of smoking tobacco.  1ppd for approximately 30 years.  Quit 2 years ago.    .  Lives with wife.  Retired [employer].

## 2017-06-13 NOTE — H&P ADULT - NSHPPHYSICALEXAM_GEN_ALL_CORE
General: WN/WD NAD  Neurology: A&Ox3, nonfocal, WILDER x 4  Respiratory: Diminished BLL.  Diminished R mid to lower lobe.  CV: Irregular, S1S2.  Abdominal: Soft, NT, ND +BS.  Extremities: +3 pitting edema BLE.  , + peripheral pulses  Incisions: Right VATS site healing well and LUIS.  No erythema or drainage.  Skin:  Right Anterior Chest wall port noted (not accessed).  Covered with dry band aide.  Right flank hardened area noted.

## 2017-06-13 NOTE — CONSULT NOTE ADULT - SUBJECTIVE AND OBJECTIVE BOX
68y/o M. with hx of HTN, DM, HLD, Afib on eliquis, stage IV lung CA, and pericardial effusion who was recently discharged s/p R. pleurodesis for malignant pleural effusion.  Pt. now reports having worsening SOB x 2 weeks.  Pt. is now feeling extremely SOB after just a few steps.  He also reports a cough productive of whitish phelgm.  CXR showing increased reticular opacities in the R. mid to lower lung fields.  R lung nodules and masses.  Small loculated R pleural effusion which is slightly decreased.  Small layering L. pleural effusion.  No fever, CP, N/V, abdominal pain, diarrhea, or dysuria.  Rest of ROS negative.      PMHX: HTN, HLD, DM, Afib on eliquis, and stage IV lung CA.     Current meds:  digoxin 0.25mg PO daily  Cardizem 90mg PO QID  Lasix 40mg IV Q12h  Potassium 20meq PO daily  Allopurinol 100mg PO BID  Colace 100mg PO TID  Marinol 2.5mg PO daily  Robitussin PRN  Xopenex neb Q6h  metformin 750mg PO BID  Oxycodone IR 5mg PO Q6h PRN  Crestor 20mg PO QHS  Senna 2 tabs PO QHS PRN  Zoloft 50mg PO daily    Allergies: NKDA    Social Hx: Quit tobacco use 2 years ago.  Prior smoked 1ppd x 30 years.  No ETOH or IVDA.     Family Hx: noncontributory.     Vitals: T: 98.4  P: 74  BP: 104/64  RR: 18  SpO2: 92  Gen: NAD  HEENT: EOMI, anicteric sclera, MMM, OP clear  Chest: diminished BS bilaterally  CV: s1s2 irregular  Abd: soft +BS NT/ND  Ext: +Pitting LE edema  Psych: appropriate behavior and affect.    WBC: 9.0 Hb.7  Platelet: 519  PT: 18.1  INR: 1.63  PTT: 35.4

## 2017-06-14 NOTE — CONSULT NOTE ADULT - SUBJECTIVE AND OBJECTIVE BOX
PULMONARY CONSULT NOTE      LANA LAGOS-[id number]    Patient is a 67y old  Male who presents with a chief complaint of " I am very short of breath" (13 Jun 2017 18:21)  Patient known to us with advanced lung cancer, mets to pleura (right) with increasing tumor burden on Opdivo.  C/O increasing dyspnea and hypoxemia with exertion. Patient without significant obstructive lung disease.  Has had increased leg edema of late and with small effusion on left.  Currently being diuresed via cardiology .  Based on last echo PA pressures relatively normal but reports decreased RV function.  No cough or sputum.  No fever.  Patient is s/p pleurodesis recently.     INTERVAL HPI/OVERNIGHT EVENTS:    MEDICATIONS  (STANDING):  sodium chloride 0.9% lock flush 3milliLiter(s) IV Push every 8 hours  digoxin     Tablet 0.25milliGRAM(s) Oral daily  diltiazem    Tablet 90milliGRAM(s) Oral four times a day  metFORMIN 750milliGRAM(s) Oral two times a day with meals  rosuvastatin 20milliGRAM(s) Oral at bedtime  docusate sodium 100milliGRAM(s) Oral three times a day  levalbuterol Inhalation 0.63milliGRAM(s) Inhalation every 6 hours  allopurinol 100milliGRAM(s) Oral two times a day after meals  potassium chloride    Tablet ER 20milliEquivalent(s) Oral daily  sertraline 50milliGRAM(s) Oral daily  furosemide   Injectable 40milliGRAM(s) IV Push every 12 hours  dronabinol 2.5milliGRAM(s) Oral daily  potassium chloride    Tablet ER 40milliEquivalent(s) Oral every 4 hours      MEDICATIONS  (PRN):  oxyCODONE IR 5milliGRAM(s) Oral every 6 hours PRN Moderate Pain (4 - 6)  guaiFENesin    Syrup 100milliGRAM(s) Oral every 6 hours PRN Cough  senna 2Tablet(s) Oral at bedtime PRN Constipation  zaleplon 5milliGRAM(s) Oral at bedtime PRN Insomnia      Allergies    No Known Allergies    Intolerances    atorvastatin (Joint Pain; Muscle Pain)      PAST MEDICAL & SURGICAL HISTORY:  Atrial fibrillation  Pericardial effusion  HTN (hypertension)  Hypercholesteremia  Diabetes  No significant past surgical history      FAMILY HISTORY:  No pertinent family history in first degree relatives      SOCIAL HISTORY  Smoking History:     REVIEW OF SYSTEMS:    CONSTITUTIONAL:  As per HPI.    HEENT:  Eyes:  No diplopia or blurred vision. ENT:  No earache, sore throat or runny nose.    CARDIOVASCULAR:  No pressure, squeezing, tightness, heaviness or aching about the chest; no palpitations.    RESPIRATORY:  Per HPI    GASTROINTESTINAL:  No nausea, vomiting or diarrhea.    GENITOURINARY:  No dysuria, frequency or urgency.    MUSCULOSKELETAL:  No joint pains    SKIN:  No new lesions.    NEUROLOGIC:  No paresthesias, fasciculations, seizures or weakness.    PSYCHIATRIC:  No disorder of thought or mood.    ENDOCRINE:  No heat or cold intolerance, polyuria or polydipsia.    HEMATOLOGICAL:  No easy bruising or bleeding.     Vital Signs Last 24 Hrs  T(C): 36.8, Max: 37 (06-13 @ 21:34)  T(F): 98.2, Max: 98.6 (06-13 @ 21:34)  HR: 94 (74 - 101)  BP: 118/66 (104/64 - 130/60)  BP(mean): --  RR: 18 (18 - 20)  SpO2: 97% (92% - 97%)    PHYSICAL EXAMINATION:    GENERAL: The patient is a well-developed, well-nourished _____in no apparent distress.     HEENT: Head is normocephalic and atraumatic. Extraocular muscles are intact. Mucous membranes are moist.     NECK: Supple.     LUNGS:Markedly diminished BS's right side with decreased at left base.     HEART: Iregular rate and rhythm without murmur.    ABDOMEN: Soft, nontender, and nondistended.  No hepatosplenomegaly is noted.    EXTREMITIES: Without any cyanosis, clubbing, rash, lesions:  4+edema.    NEUROLOGIC: Grossly intact.    SKIN: No ulceration or induration present.      LABS:                        11.2   8.4   )-----------( 508      ( 14 Jun 2017 05:45 )             34.7     06-14    134<L>  |  90<L>  |  26.0<H>  ----------------------------<  136<H>  3.7   |  31.0<H>  |  0.73    Ca    8.8      14 Jun 2017 05:45  Phos  3.8     06-14  Mg     1.8     06-14    TPro  6.0<L>  /  Alb  2.7<L>  /  TBili  0.3<L>  /  DBili  x   /  AST  27  /  ALT  17  /  AlkPhos  133<H>  06-13    PT/INR - ( 13 Jun 2017 18:35 )   PT: 18.1 sec;   INR: 1.63 ratio         PTT - ( 13 Jun 2017 18:35 )  PTT:35.4 sec                    MICROBIOLOGY:    RADIOLOGY & ADDITIONAL STUDIES:CXR reviewed>there is increasing pleural thickening and tumor load right chest.  Small effusion left side.

## 2017-06-14 NOTE — CONSULT NOTE ADULT - SUBJECTIVE AND OBJECTIVE BOX
Patient is a 67y old  Male who presents with a chief complaint of " I am very short of breath" (2017 18:21)      HPI:    The patient is a very pleasant 67-year-old male.  I had seen earlier last month when he presented with dyspnea and bilateral pleural effusion. His history is significant for hypertension, hyperlipidemia, as well as atrial fibrillation.  The patient has adenocarcinoma of the right lung which was diagnosed in 2016.  He had pericardial effusion, underwent emergent pericardiocentesis.  He is followed with Dr. Castro his oncologist and he has received multiple rounds of chemotherapy.  He has been on Opdivo for further management of the lung cancer.  He had a PET scan on 2017.  This study demonstrated increased size and metabolism of the silent component of cavitary right upper lobe lung mass and increased numbers, size, and metabolism of nodularity which are distant to it. Hypermetabolic bilateral supraclavicular mediastinal and right perihilar lymph nodes unchanged in distribution, and either similar or increased in size and metabolism.  Increased metabolism of the metastatic hepatic lesion as well as minimally hypermetabolic partial opacification of left maxillary sinus.  There was also hypermetabolism of the bowel, right chest wall, and the hip.      He had b/l chest tubes followed by pleurodesis of the right lung. He is short of breath with only a few steps, weak with poor apatite and lost weight. He has pleuritic right sided cp. No bleeding or palpitations The patient has been feeling more short of breath for the past few days.  He has seen his primary care physician and was advised to come to the emergency room.  He has no chest pain.  He has no other history of cardiac disease.  There was no evidence of pericardial effusion.  He has been compliant with Eliquis.  He has no bleeding.      PAST MEDICAL & SURGICAL HISTORY:  Atrial fibrillation  Pericardial effusion  HTN (hypertension)  Hypercholesteremia  Diabetes  No significant past surgical history      PREVIOUS DIAGNOSTIC TESTING:      ECHO FINDINGS: 17:   1. Technically fair study.   2. Normal global left ventricular systolic function.   3. Left ventricular ejection fraction, by visual estimation, is 55 to   60%.   4. Mid and apical inferior septum is abnormal as described above.   5. Thickening of the anterior and posterior mitral valve leaflets.   6. Sclerotic aortic valve with normal opening.   7. There is mild aortic root calcification.   8. Trace mitral valve regurgitation.   9. Estimated pulmonary artery systolic pressure is 35.0 mmHg assuming a   right atrial pressure of 3 mmHg, which is consistent with borderline   pulmonary hypertension.  10. Moderate pleural effusion in the left lateral region.  11. Trivial pericardial effusion.    Allergies:  No Known Allergies    atorvastatin (Joint Pain; Muscle Pain)      MEDICATIONS  (STANDING):  sodium chloride 0.9% lock flush 3milliLiter(s) IV Push every 8 hours  digoxin     Tablet 0.25milliGRAM(s) Oral daily  diltiazem    Tablet 90milliGRAM(s) Oral four times a day  metFORMIN 750milliGRAM(s) Oral two times a day with meals  rosuvastatin 20milliGRAM(s) Oral at bedtime  docusate sodium 100milliGRAM(s) Oral three times a day  levalbuterol Inhalation 0.63milliGRAM(s) Inhalation every 6 hours  allopurinol 100milliGRAM(s) Oral two times a day after meals  potassium chloride    Tablet ER 20milliEquivalent(s) Oral daily  sertraline 50milliGRAM(s) Oral daily  furosemide   Injectable 40milliGRAM(s) IV Push every 12 hours  dronabinol 2.5milliGRAM(s) Oral daily  MEDICATIONS  (PRN):  oxyCODONE IR 5milliGRAM(s) Oral every 6 hours PRN Moderate Pain (4 - 6)  guaiFENesin    Syrup 100milliGRAM(s) Oral every 6 hours PRN Cough  senna 2Tablet(s) Oral at bedtime PRN Constipation  zaleplon 5milliGRAM(s) Oral at bedtime PRN Insomnia      FAMILY HISTORY:  No pertinent family history in first degree relatives      SOCIAL HISTORY: Prior tobacco use, no etoh or drug use.     REVIEW OF SYSTEMS:  CONSTITUTIONAL: No fever,  + weight loss, +fatigue  EYES: No eye pain, visual disturbances, or discharge  ENMT:  No difficulty hearing, tinnitus, vertigo; No sinus or throat pain  NECK: No pain or stiffness  RESPIRATORY: + cough,  + wheezing, chills or hemoptysis; + Shortness of Breath  CARDIOVASCULAR:  No palpitations, see hpi  GASTROINTESTINAL: No abdominal or epigastric pain. No nausea, vomiting, or hematemesis; No diarrhea or constipation. No melena or hematochezia.  GENITOURINARY: No dysuria, frequency, hematuria, or incontinence  NEUROLOGICAL: No headaches, memory loss, loss of strength, numbness, or tremors  SKIN: No itching, burning, rashes, or lesions   LYMPH Nodes: No enlarged glands  ENDOCRINE: No heat or cold intolerance; No hair loss  MUSCULOSKELETAL: No joint pain or swelling; No muscle, back, or extremity pain  PSYCHIATRIC: + depression  HEME/LYMPH: No easy bruising, or bleeding gums  ALLERY AND IMMUNOLOGIC: No hives or eczema	    Vital Signs Last 24 Hrs  T(C): 36.7, Max: 37 ( @ 21:34)  T(F): 98, Max: 98.6 ( @ 21:34)  HR: 101 (74 - 101)  BP: 120/70 (104/64 - 130/60)  BP(mean): --  RR: 20 (18 - 20)  SpO2: 96% (92% - 96%)    Daily     Daily Weight in k (2017 05:00)    I&O's Detail    I & Os for current day (as of 2017 08:22)  =============================================  IN:    Oral Fluid: 320 ml    Total IN: 320 ml  ---------------------------------------------  OUT:    Voided: 450 ml    Total OUT: 450 ml  ---------------------------------------------  Total NET: -130 ml      PHYSICAL EXAM:  Appearance: tired looking, 	  HEENT:   Normal oral mucosa, PERRL, EOMI, sclera non-icteric	  Lymphatic: No cervical lymphadenopathy  Cardiovascular: IRRR, 2+ edema to thigh  Respiratory: decrease air entry right and left lung	  Psychiatry: A & O x 3, Mood & affect appropriate  Gastrointestinal:  Soft, Non-tender, + BS, no bruits	  Skin: No rashes, No ecchymoses, No cyanosis  Neurologic: Grossly non-focal,   Extremities: Normal range of motion, No clubbing, cyanosis  Vascular: Peripheral pulses palpable 2+ bilaterally      INTERPRETATION OF TELEMETRY: afib, rate controlled     ECG: afib, nonspecific t wave changes     LABS:                        11.2   8.4   )-----------( 508      ( 2017 05:45 )             34.7     06-14    134<L>  |  90<L>  |  26.0<H>  ----------------------------<  136<H>  3.7   |  31.0<H>  |  0.73    Ca    8.8      2017 05:45  Phos  3.8     06-14  Mg     1.8     06-14    TPro  6.0<L>  /  Alb  2.7<L>  /  TBili  0.3<L>  /  DBili  x   /  AST  27  /  ALT  17  /  AlkPhos  133<H>  06-13    PT/INR - ( 2017 18:35 )   PT: 18.1 sec;   INR: 1.63 ratio       PTT - ( 2017 18:35 )  PTT:35.4 sec    I&O's Summary    I & Os for current day (as of 2017 08:22)  =============================================  IN: 320 ml / OUT: 450 ml / NET: -130 ml    BNP    RADIOLOGY & ADDITIONAL STUDIES:

## 2017-06-14 NOTE — PROGRESS NOTE ADULT - SUBJECTIVE AND OBJECTIVE BOX
CARINA LAGOS    [id number]    67y      Male    CC: worsening SOB since last 3 weeks  poor appetite, low energy  orthopnea+    INTERVAL HPI/OVERNIGHT EVENTS: no acute events overngiht    REVIEW OF SYSTEMS:    CONSTITUTIONAL:+ weight loss, or fatigue  RESPIRATORY: No cough, wheezing, hemoptysis;   CARDIOVASCULAR: No chest pain, palpitations  GASTROINTESTINAL: No abdominal or epigastric pain. No nausea, vomiting        Vital Signs Last 24 Hrs  T(C): 36.8, Max: 37 (06-13 @ 21:34)  T(F): 98.2, Max: 98.6 (06-13 @ 21:34)  HR: 94 (74 - 101)  BP: 118/66 (104/64 - 130/60)  BP(mean): --  RR: 18 (18 - 20)  SpO2: 97% (92% - 97%)    PHYSICAL EXAM:    GENERAL: NAD, chronically ill appearing  HEENT: PERRL, +EOMI  NECK: soft, Supple  CHEST/LUNG: AE decreased on RT on heard on upper 1/3 rd, Left lower crackles+ No wheezing. tachypneic   HEART: S1S2+, Regular rate and rhythm; No murmurs, rubs, or gallops  ABDOMEN: Soft, Nontender, Nondistended; Bowel sounds present  EXTREMITIES:  No clubbing, cyanosis, or 2+ edema  SKIN: No rashes or lesions  NEURO: AAOX3, no focal deficits,         LABS:                        11.2   8.4   )-----------( 508      ( 14 Jun 2017 05:45 )             34.7     06-14    134<L>  |  90<L>  |  26.0<H>  ----------------------------<  136<H>  3.7   |  31.0<H>  |  0.73    Ca    8.8      14 Jun 2017 05:45  Phos  3.8     06-14  Mg     1.8     06-14    TPro  6.0<L>  /  Alb  2.7<L>  /  TBili  0.3<L>  /  DBili  x   /  AST  27  /  ALT  17  /  AlkPhos  133<H>  06-13    PT/INR - ( 13 Jun 2017 18:35 )   PT: 18.1 sec;   INR: 1.63 ratio         PTT - ( 13 Jun 2017 18:35 )  PTT:35.4 sec        MEDICATIONS  (STANDING):  sodium chloride 0.9% lock flush 3milliLiter(s) IV Push every 8 hours  digoxin     Tablet 0.25milliGRAM(s) Oral daily  diltiazem    Tablet 90milliGRAM(s) Oral four times a day  metFORMIN 750milliGRAM(s) Oral two times a day with meals  rosuvastatin 20milliGRAM(s) Oral at bedtime  docusate sodium 100milliGRAM(s) Oral three times a day  levalbuterol Inhalation 0.63milliGRAM(s) Inhalation every 6 hours  allopurinol 100milliGRAM(s) Oral two times a day after meals  potassium chloride    Tablet ER 20milliEquivalent(s) Oral daily  sertraline 50milliGRAM(s) Oral daily  furosemide   Injectable 40milliGRAM(s) IV Push every 12 hours  dronabinol 2.5milliGRAM(s) Oral daily  potassium chloride    Tablet ER 40milliEquivalent(s) Oral every 4 hours    MEDICATIONS  (PRN):  oxyCODONE IR 5milliGRAM(s) Oral every 6 hours PRN Moderate Pain (4 - 6)  guaiFENesin    Syrup 100milliGRAM(s) Oral every 6 hours PRN Cough  senna 2Tablet(s) Oral at bedtime PRN Constipation  zaleplon 5milliGRAM(s) Oral at bedtime PRN Insomnia      RADIOLOGY & ADDITIONAL TESTS:

## 2017-06-14 NOTE — CONSULT NOTE ADULT - SUBJECTIVE AND OBJECTIVE BOX
HPI: Patient is a 67y Male seen on consultation for the evaluation and management of Adenocarcinoma of the lung, diagnosed 7/16; had malignant pericardial effusion requiring pericardiocentesis.  Has been receiving chemotherapy thru Dr. Castro, most recently with Opdivo.  Last admitted with pleural effusions requiring drainage and pleurodesis.  Has radiologic and clinical evidence of diease progression.  Has been increasingly dyspneic, despite 4 liters oxygen.  Cannot get comfortable or move short distance without marked dyspnea.  Difficult to eat; has been losing weight and retaining fluid.  Also has hepatic lesion.  Feels warm, but has no fever.  Cannot expectorate.     PAST MEDICAL & SURGICAL HISTORY:  Atrial fibrillation  Pericardial effusion  HTN (hypertension)  Hypercholesteremia  Diabetes  No significant past surgical history      REVIEW OF SYSTEMS      General:Fatigue, weight loss, fluid retention	    Skin/Breast:No rash  	    	  ENMT:	No sore throat or dysphagia    Respiratory and Thorax:Markedly dyspneic  	  Cardiovascular:	No ches pain    Gastrointestinal:	No N/V/D    Genitourinary:	No dysuria    Musculoskeletal:No bone pain	            Hematology/Lymphatics:No bleeding	    	        MEDICATIONS  (STANDING):  sodium chloride 0.9% lock flush 3milliLiter(s) IV Push every 8 hours  digoxin     Tablet 0.25milliGRAM(s) Oral daily  diltiazem    Tablet 90milliGRAM(s) Oral four times a day  rosuvastatin 20milliGRAM(s) Oral at bedtime  docusate sodium 100milliGRAM(s) Oral three times a day  levalbuterol Inhalation 0.63milliGRAM(s) Inhalation every 6 hours  allopurinol 100milliGRAM(s) Oral two times a day after meals  potassium chloride    Tablet ER 20milliEquivalent(s) Oral daily  sertraline 50milliGRAM(s) Oral daily  furosemide   Injectable 40milliGRAM(s) IV Push every 12 hours  dronabinol 2.5milliGRAM(s) Oral daily  apixaban 5milliGRAM(s) Oral two times a day  insulin lispro (HumaLOG) corrective regimen sliding scale  SubCutaneous three times a day before meals  dextrose 5%. 1000milliLiter(s) IV Continuous <Continuous>  dextrose 50% Injectable 12.5Gram(s) IV Push once  dextrose 50% Injectable 25Gram(s) IV Push once  dextrose 50% Injectable 25Gram(s) IV Push once    MEDICATIONS  (PRN):  oxyCODONE IR 5milliGRAM(s) Oral every 6 hours PRN Moderate Pain (4 - 6)  guaiFENesin    Syrup 100milliGRAM(s) Oral every 6 hours PRN Cough  senna 2Tablet(s) Oral at bedtime PRN Constipation  zaleplon 5milliGRAM(s) Oral at bedtime PRN Insomnia  dextrose Gel 1Dose(s) Oral once PRN Blood Glucose LESS THAN 70 milliGRAM(s)/deciliter  glucagon  Injectable 1milliGRAM(s) IntraMuscular once PRN Glucose LESS THAN 70 milligrams/deciliter      Allergies    No Known Allergies    Intolerances    atorvastatin (Joint Pain; Muscle Pain)      SOCIAL HISTORY:    :    Marital Status:  Occupation:    FAMILY HISTORY:  No pertinent family history in first degree relatives      	          Vital Signs Last 24 Hrs  T(C): 36.6, Max: 37 (06-13 @ 21:34)  T(F): 97.8, Max: 98.6 (06-13 @ 21:34)  HR: 85 (83 - 101)  BP: 120/64 (118/66 - 130/60)  BP(mean): --  RR: 20 (18 - 20)  SpO2: 95% (94% - 97%)    PHYSICAL EXAM:      Constitutional:Chronically ill-appearing, with bitemporal wasting, marked dyspnea, uncomfortable    Eyes:Pale sclera    ENMT:Dry MM's    Neck:No adenopathy            Respiratory:Diffusely decreased BS, without rhonchi    Cardiovascular:RRR    Gastrointestinal:Non-tender            Extremities:3 plus edema up to knees                Lymph Nodes:No adenopathy                LABS:                        11.2   8.4   )-----------( 508      ( 14 Jun 2017 05:45 )             34.7     06-14    134<L>  |  90<L>  |  26.0<H>  ----------------------------<  136<H>  3.7   |  31.0<H>  |  0.73    Ca    8.8      14 Jun 2017 05:45  Phos  3.8     06-14  Mg     1.8     06-14    TPro  6.0<L>  /  Alb  2.7<L>  /  TBili  0.3<L>  /  DBili  x   /  AST  27  /  ALT  17  /  AlkPhos  133<H>  06-13    PT/INR - ( 13 Jun 2017 18:35 )   PT: 18.1 sec;   INR: 1.63 ratio         PTT - ( 13 Jun 2017 18:35 )  PTT:35.4 sec      RADIOLOGY & ADDITIONAL STUDIES:

## 2017-06-14 NOTE — CHART NOTE - NSCHARTNOTEFT_GEN_A_CORE
Thoracic Note    Pt in bed.  Wife at bedside. Dr Rebolledo reviewed CT scan.  Feels there is not enough fluid in plural space to drain.  Pt remains on 4 L NC, mildly tachypneic.  Plan to cont diuresis and follow up CXR in am

## 2017-06-14 NOTE — PROGRESS NOTE ADULT - ASSESSMENT
68y/o M. with hx of HTN, HLD, DM, Afib on eliquis, and stage IV lung CA presenting with increasing SOB/ANDRES x 3 weeks. Admitted initially on Thoracic Sx service , transferred to Medicine. Not enough Pleural fluid for draining. Cardiology consulted - started on iv lasix as he clinically appears volume overload - multifactorial hypoprotenemia + Tumor burden.    -Peural effusion: continue lasix 40mg IV Q12 and Xopenex Neb Q6h.  minimal fluid on imaging - unable to undergo IR drainage procedure. No surgical options per Thoracic Surgery.  - Rt Lung cancer - adenocarcinoma - S/p pleurodesis - heavy tumor burden - await oncology recs - Consult placed. He is open to Palliative consult.  -Afib: continue cardizem 90mg PO QID and digoxin 0.25mg PO daily.  Holding eliquis for possible IR drainage procedure. resume eliquis as procedure not an option   -HTN: continue cardizem as above.    -HLD: continue Crestor 20mg PO QHS  -DM:  humalog sliding scale    - Severe protein calorie malnutrition  -VTE ppx: SCD

## 2017-06-15 NOTE — CONSULT NOTE ADULT - PROBLEM SELECTOR RECOMMENDATION 4
-spoke to Dr. Rebolledo, left sided effusion is not large enough to be causing any distress or require any drainage.

## 2017-06-15 NOTE — CONSULT NOTE ADULT - PROBLEM SELECTOR RECOMMENDATION 9
-Dr. Gonzalez's note reviewed and appreciated. Evidence of progression of disease, given clinical situation, unlikely to be a candidate for further aggressive therapies.

## 2017-06-15 NOTE — PROGRESS NOTE ADULT - SUBJECTIVE AND OBJECTIVE BOX
CARINA LAGOS    [id number]    67y      Male    CC: SOB     INTERVAL HPI/OVERNIGHT EVENTS: on hi flow o2    REVIEW OF SYSTEMS:      RESPIRATORY: No cough, wheezing, hemoptysis  CARDIOVASCULAR: No chest pain, palpitations      Vital Signs Last 24 Hrs  T(C): 36.7, Max: 36.8 (06-15 @ 05:20)  T(F): 98, Max: 98.2 (06-15 @ 05:20)  HR: 92 (70 - 92)  BP: 130/62 (130/62 - 142/72)  BP(mean): --  RR: 20 (20 - 20)  SpO2: 93% (92% - 95%)    PHYSICAL EXAM:    GENERAL: mild respiratory distress  HEENT: PERRL, +EOMI  NECK: soft, Supple, No JVD,   CHEST/LUNG: Clear to percussion bilaterally; AE decreased RT>Lt , tachypenic  HEART: S1S2+, Regular rate and rhythm; No murmurs, rubs, or gallops  EXTREMITIES: No clubbing, cyanosis, or1+ edema  SKIN: No rashes or lesions  NEURO: AAOX3, no focal deficits        LABS:                        11.2   8.4   )-----------( 508      ( 14 Jun 2017 05:45 )             34.7     06-14    134<L>  |  90<L>  |  26.0<H>  ----------------------------<  136<H>  3.7   |  31.0<H>  |  0.73    Ca    8.8      14 Jun 2017 05:45  Phos  3.8     06-14  Mg     1.8     06-14    TPro  6.0<L>  /  Alb  2.7<L>  /  TBili  0.3<L>  /  DBili  x   /  AST  27  /  ALT  17  /  AlkPhos  133<H>  06-13    PT/INR - ( 13 Jun 2017 18:35 )   PT: 18.1 sec;   INR: 1.63 ratio         PTT - ( 13 Jun 2017 18:35 )  PTT:35.4 sec        MEDICATIONS  (STANDING):  sodium chloride 0.9% lock flush 3milliLiter(s) IV Push every 8 hours  digoxin     Tablet 0.25milliGRAM(s) Oral daily  diltiazem    Tablet 90milliGRAM(s) Oral four times a day  rosuvastatin 20milliGRAM(s) Oral at bedtime  docusate sodium 100milliGRAM(s) Oral three times a day  levalbuterol Inhalation 0.63milliGRAM(s) Inhalation every 6 hours  allopurinol 100milliGRAM(s) Oral two times a day after meals  potassium chloride    Tablet ER 20milliEquivalent(s) Oral daily  sertraline 50milliGRAM(s) Oral daily  furosemide   Injectable 40milliGRAM(s) IV Push every 12 hours  dronabinol 2.5milliGRAM(s) Oral daily  apixaban 5milliGRAM(s) Oral two times a day  insulin lispro (HumaLOG) corrective regimen sliding scale  SubCutaneous three times a day before meals  dextrose 5%. 1000milliLiter(s) IV Continuous <Continuous>  dextrose 50% Injectable 12.5Gram(s) IV Push once  dextrose 50% Injectable 25Gram(s) IV Push once  dextrose 50% Injectable 25Gram(s) IV Push once  magnesium oxide 400milliGRAM(s) Oral two times a day with meals  morphine  - Injectable 2milliGRAM(s) IV Push at bedtime    MEDICATIONS  (PRN):  guaiFENesin    Syrup 100milliGRAM(s) Oral every 6 hours PRN Cough  senna 2Tablet(s) Oral at bedtime PRN Constipation  dextrose Gel 1Dose(s) Oral once PRN Blood Glucose LESS THAN 70 milliGRAM(s)/deciliter  glucagon  Injectable 1milliGRAM(s) IntraMuscular once PRN Glucose LESS THAN 70 milligrams/deciliter  morphine Concentrate 5milliGRAM(s) SubLingual every 4 hours PRN dyspnea      RADIOLOGY & ADDITIONAL TESTS:

## 2017-06-15 NOTE — PROGRESS NOTE ADULT - ASSESSMENT
Stage 4 lung Ca with marked restriction of right lung.  Not much fluid on left and review of CT last suggests left pleural based lesion.      Plan:  Supportive and comfort care  Palliative care input pending  Home hospice would be appropriate  Little to offer from a pulmonary point of view unfortunately.

## 2017-06-15 NOTE — PROGRESS NOTE ADULT - SUBJECTIVE AND OBJECTIVE BOX
Patient has been seen by Palliative care and is agreeable to Hospice.  Will provide follow up as needed.

## 2017-06-15 NOTE — PROGRESS NOTE ADULT - ASSESSMENT
66y/o M. with hx of HTN, HLD, DM, Afib on eliquis, and stage IV lung CA presenting with increasing SOB/ANDRES x 3 weeks. Admitted initially on Thoracic Sx service , transferred to Medicine. Not enough Pleural fluid for draining. Cardiology consulted - started on iv lasix as he clinically appears volume overload - multifactorial hypoprotenemia + Tumor burden.    -Peural effusion: continue lasix 40mg IV Q12 and Xopenex Neb Q6h.  minimal fluid on imaging - unable to undergo IR drainage procedure. No surgical options per Thoracic Surgery.  - Rt Lung cancer - adenocarcinoma - S/p pleurodesis - heavy tumor burden - oncology recs - reviewed. Palliative consultrecs apprecaited  -Afib: continue cardizem 90mg PO QID and digoxin 0.25mg PO daily.  Holding eliquis for possible IR drainage procedure. resume eliquis as procedure not an option   -HTN: continue cardizem as above.    -HLD: continue Crestor 20mg PO QHS  -DM:  humalog sliding scale    - Severe protein calorie malnutrition  -VTE ppx: SCD  hospice eval

## 2017-06-15 NOTE — PROGRESS NOTE ADULT - SUBJECTIVE AND OBJECTIVE BOX
PULMONARY PROGRESS NOTE      GAMAL LAGOSLATA-[id number]    Patient is a 67y old  Male who presents with a chief complaint of " I am very short of breath" (13 Jun 2017 18:21)      INTERVAL HPI/OVERNIGHT EVENTS:  On high flow but comfortable  Oncology note appreciated  Awaiting palliative care  No significant fluid by US    MEDICATIONS  (STANDING):  sodium chloride 0.9% lock flush 3milliLiter(s) IV Push every 8 hours  digoxin     Tablet 0.25milliGRAM(s) Oral daily  diltiazem    Tablet 90milliGRAM(s) Oral four times a day  rosuvastatin 20milliGRAM(s) Oral at bedtime  docusate sodium 100milliGRAM(s) Oral three times a day  levalbuterol Inhalation 0.63milliGRAM(s) Inhalation every 6 hours  allopurinol 100milliGRAM(s) Oral two times a day after meals  potassium chloride    Tablet ER 20milliEquivalent(s) Oral daily  sertraline 50milliGRAM(s) Oral daily  furosemide   Injectable 40milliGRAM(s) IV Push every 12 hours  dronabinol 2.5milliGRAM(s) Oral daily  apixaban 5milliGRAM(s) Oral two times a day  insulin lispro (HumaLOG) corrective regimen sliding scale  SubCutaneous three times a day before meals  dextrose 5%. 1000milliLiter(s) IV Continuous <Continuous>  dextrose 50% Injectable 12.5Gram(s) IV Push once  dextrose 50% Injectable 25Gram(s) IV Push once  dextrose 50% Injectable 25Gram(s) IV Push once  magnesium oxide 400milliGRAM(s) Oral two times a day with meals      MEDICATIONS  (PRN):  oxyCODONE IR 5milliGRAM(s) Oral every 6 hours PRN Moderate Pain (4 - 6)  guaiFENesin    Syrup 100milliGRAM(s) Oral every 6 hours PRN Cough  senna 2Tablet(s) Oral at bedtime PRN Constipation  zaleplon 5milliGRAM(s) Oral at bedtime PRN Insomnia  dextrose Gel 1Dose(s) Oral once PRN Blood Glucose LESS THAN 70 milliGRAM(s)/deciliter  glucagon  Injectable 1milliGRAM(s) IntraMuscular once PRN Glucose LESS THAN 70 milligrams/deciliter      Allergies    No Known Allergies    Intolerances    atorvastatin (Joint Pain; Muscle Pain)      PAST MEDICAL & SURGICAL HISTORY:  Atrial fibrillation  Pericardial effusion  HTN (hypertension)  Hypercholesteremia  Diabetes  No significant past surgical history      SOCIAL HISTORY  Smoking History:       REVIEW OF SYSTEMS:    CONSTITUTIONAL:  No distress    HEENT:  Eyes:  No diplopia or blurred vision. ENT:  No earache, sore throat or runny nose.    CARDIOVASCULAR:  No pressure, squeezing, tightness, heaviness or aching about the chest; no palpitations.    RESPIRATORY: Per HPI    GASTROINTESTINAL:  No nausea, vomiting or diarrhea., bloated    GENITOURINARY:  No dysuria, frequency or urgency.    MUSCULOSKELETAL:  No joint pain    SKIN:  No new lesions.    NEUROLOGIC:  No paresthesias, fasciculations, seizures or weakness.    PSYCHIATRIC:  No disorder of thought or mood.    ENDOCRINE:  No heat or cold intolerance, polyuria or polydipsia.    HEMATOLOGICAL:  No easy bruising or bleeding.     Vital Signs Last 24 Hrs  T(C): 36.7, Max: 36.8 (06-15 @ 05:20)  T(F): 98, Max: 98.2 (06-15 @ 05:20)  HR: 87 (70 - 87)  BP: 130/62 (120/64 - 142/72)  BP(mean): --  RR: 20 (20 - 20)  SpO2: 95% (92% - 95%)    PHYSICAL EXAMINATION:    GENERAL: The patient is awake and alert in no apparent distress.     HEENT: Head is normocephalic and atraumatic. Extraocular muscles are intact. Mucous membranes are moist.    NECK: Supple.    LUNGS: Right markedly diminished.  Left with scattered crackles but diminished.     HEART: Regular rate and rhythm without murmur.    ABDOMEN: Soft, nontender, and nondistended.      EXTREMITIES: Without any cyanosis, clubbing, rash, lesions or edema.    NEUROLOGIC: Grossly intact.    SKIN: No ulceration or induration present.      LABS:                        11.2   8.4   )-----------( 508      ( 14 Jun 2017 05:45 )             34.7     06-14    134<L>  |  90<L>  |  26.0<H>  ----------------------------<  136<H>  3.7   |  31.0<H>  |  0.73    Ca    8.8      14 Jun 2017 05:45  Phos  3.8     06-14  Mg     1.8     06-14    TPro  6.0<L>  /  Alb  2.7<L>  /  TBili  0.3<L>  /  DBili  x   /  AST  27  /  ALT  17  /  AlkPhos  133<H>  06-13    PT/INR - ( 13 Jun 2017 18:35 )   PT: 18.1 sec;   INR: 1.63 ratio         PTT - ( 13 Jun 2017 18:35 )  PTT:35.4 sec                    MICROBIOLOGY:    RADIOLOGY & ADDITIONAL STUDIES:

## 2017-06-15 NOTE — CONSULT NOTE ADULT - ASSESSMENT
68y/o M. with hx of HTN, HLD, DM, Afib on eliquis, and stage IV lung CA presenting with increasing SOB/ANDRES x 2 weeks.  -Peural effusion: continue lasix 40mg IV Q12 and Xopenex Neb Q6h.  Possible IR drainage procedure tomorrow.  Thoracic Surgery f/u.  -Afib: continue cardizem 90mg PO QID and digoxin 0.25mg PO daily.  Holding eliquis for possible IR drainage procedure.    -HTN: continue cardizem as above.    -HLD: continue Crestor 20mg PO QHS  -DM: On metformin 750mg PO BID per primary team.  Consider switching to low dose humalog sliding scale if imaging with IV contrast may be needed during the hospitalization.    -VTE ppx: SCD
I reviewed CT scan images performed at [facility] with the patient, wife and daughter. He had never seen extent of his disease with right sided mass and air bronchograms affecting most of the right lower lobe. He also has medium sized left sided pleural effusion. He has low albumin which has caused third spacing and worsening dyspnea as well. I spoke to Tila CT-surgery NP. I recommended the followin. IV diuretics with lasix  2. Glucerna  3. Marinol to increase apatite, can try megace if needed  4. PT  5. Afib with controlled VR. On lovenox for now  6. plan to have left side thoracentesis.  7. stirct i/os and daily weight  8. started on zoloft due to depression.   no active cardiac issues at this time.   Unfortunately, his cancer has progressed. We had a long discussion regarding further chemo options and possibly hospice care if there is no treatment to slow down progression of lung cancer.
Mr. Brown is seen on consult for end stage lung cancer, has radiologic evidence of disease progression, and , per Pulmonary, restrictive component making breathing difficult.  In process of setting up high flow system for comfort.    I discussed code status with wife and Nursing.  Patient clearly states he does not want advanced life support and signed DNR/DNI.    Patient appears to be approaching being  Hospice appropriate.  Family is agreeable to palliative care to help with transition.  At this point, don't see that patient is candidate for additional treatment unless his clinical status markedly improves.
Patient with terminal disease.  Marked restrictive component secondary to progressive tumor burden.  Likely with RV dysfunction with progressive edema though with history of liver mets which may be impacting on his edema.  Prognosis poor.      Plan:  1.CT not available for review but apparently left effusion is not large enough to proceed with drainage.  2.No likely advantage to bronchodilators  3.O2 titration  4.Agree with diuresis attempt at this point.
67M with stage IV lung cancer with progressive disease, shortness of breath.

## 2017-06-15 NOTE — CONSULT NOTE ADULT - SUBJECTIVE AND OBJECTIVE BOX
HPI: 67M with PMH as listed admitted 6/13 with     PERTINENT PMH REVIEWED: Yes     PAST MEDICAL & SURGICAL HISTORY:  Atrial fibrillation  Pericardial effusion  HTN (hypertension)  Hypercholesteremia  Diabetes  No significant past surgical history    SOCIAL HISTORY:                                     Admitted from:  home  SNF  [facility]     Surrogate    FAMILY HISTORY:  No pertinent family history in first degree relatives      Baseline ADLs (prior to admission):  Independent/ Dependent      Allergies    No Known Allergies    Intolerances    atorvastatin (Joint Pain; Muscle Pain)    Present Symptoms:     Dyspnea: 0 1 2 3   Nausea/Vomiting: Yes No  Anxiety:  Yes No  Depression: Yes No  Fatigue: Yes No  Loss of appetite: Yes No    Pain:             Character-            Duration-            Effect-            Factors-            Frequency-            Location-            Severity-    Review of Systems: Reviewed                     Negative:                     Positive:  Unable to obtain due to poor mentation   All others negative    MEDICATIONS  (STANDING):  sodium chloride 0.9% lock flush 3milliLiter(s) IV Push every 8 hours  digoxin     Tablet 0.25milliGRAM(s) Oral daily  diltiazem    Tablet 90milliGRAM(s) Oral four times a day  rosuvastatin 20milliGRAM(s) Oral at bedtime  docusate sodium 100milliGRAM(s) Oral three times a day  levalbuterol Inhalation 0.63milliGRAM(s) Inhalation every 6 hours  allopurinol 100milliGRAM(s) Oral two times a day after meals  potassium chloride    Tablet ER 20milliEquivalent(s) Oral daily  sertraline 50milliGRAM(s) Oral daily  furosemide   Injectable 40milliGRAM(s) IV Push every 12 hours  dronabinol 2.5milliGRAM(s) Oral daily  apixaban 5milliGRAM(s) Oral two times a day  insulin lispro (HumaLOG) corrective regimen sliding scale  SubCutaneous three times a day before meals  dextrose 5%. 1000milliLiter(s) IV Continuous <Continuous>  dextrose 50% Injectable 12.5Gram(s) IV Push once  dextrose 50% Injectable 25Gram(s) IV Push once  dextrose 50% Injectable 25Gram(s) IV Push once  magnesium oxide 400milliGRAM(s) Oral two times a day with meals    MEDICATIONS  (PRN):  oxyCODONE IR 5milliGRAM(s) Oral every 6 hours PRN Moderate Pain (4 - 6)  guaiFENesin    Syrup 100milliGRAM(s) Oral every 6 hours PRN Cough  senna 2Tablet(s) Oral at bedtime PRN Constipation  zaleplon 5milliGRAM(s) Oral at bedtime PRN Insomnia  dextrose Gel 1Dose(s) Oral once PRN Blood Glucose LESS THAN 70 milliGRAM(s)/deciliter  glucagon  Injectable 1milliGRAM(s) IntraMuscular once PRN Glucose LESS THAN 70 milligrams/deciliter    PHYSICAL EXAM:    Vital Signs Last 24 Hrs  T(C): 36.7, Max: 36.8 (06-15 @ 05:20)  T(F): 98, Max: 98.2 (06-15 @ 05:20)  HR: 87 (70 - 87)  BP: 130/62 (120/64 - 142/72)  BP(mean): --  RR: 20 (20 - 20)  SpO2: 95% (92% - 95%)    General: alert  oriented x ____ lethargic agitated                  cachexia  nonverbal  coma    Karnofsky:  %    HEENT: normal  dry mouth  ET tube/trach    Lungs: comfortable tachypnea/labored breathing  excessive secretions    CV: normal  tachycardia    GI: normal  distended  tender  no BS               PEG/NG/OG tube  constipation  last BM:     : normal  incontinent  oliguria/anuria  roberson    MSK: normal  weakness  edema             ambulatory  bedbound/wheelchair bound    Skin: normal  pressure ulcers- Stage_____  no rash    LABS:                        11.2   8.4   )-----------( 508      ( 14 Jun 2017 05:45 )             34.7     06-14    134<L>  |  90<L>  |  26.0<H>  ----------------------------<  136<H>  3.7   |  31.0<H>  |  0.73    Ca    8.8      14 Jun 2017 05:45  Phos  3.8     06-14  Mg     1.8     06-14    TPro  6.0<L>  /  Alb  2.7<L>  /  TBili  0.3<L>  /  DBili  x   /  AST  27  /  ALT  17  /  AlkPhos  133<H>  06-13    PT/INR - ( 13 Jun 2017 18:35 )   PT: 18.1 sec;   INR: 1.63 ratio       PTT - ( 13 Jun 2017 18:35 )  PTT:35.4 sec    I&O's Summary  I & Os for 24h ending 15 Danny 2017 07:00  =============================================  IN: 650 ml / OUT: 1220 ml / NET: -570 ml    I & Os for current day (as of 15 Danny 2017 13:05)  =============================================  IN: 120 ml / OUT: 0 ml / NET: 120 ml      RADIOLOGY & ADDITIONAL STUDIES:    ADVANCE DIRECTIVES:   DNR YES NO  Completed on:                     MOLST  YES NO   Completed on:  Living Will  YES NO   Completed on: HPI: 67M with PMH as listed admitted 6/13 with worsening and progressive dyspnea.     He was recently admitted 5/24- 5/28 with malignant pleural effusion and right pleurodesis.     PERTINENT PMH REVIEWED: Yes     PAST MEDICAL & SURGICAL HISTORY:  Atrial fibrillation  Pericardial effusion  HTN (hypertension)  Hypercholesteremia  Diabetes  No significant past surgical history  Stage IV Lung cancer, malignant effusion, oxygen dependent    SOCIAL HISTORY:  + past smoker - 30 pack year                                    Admitted from:  home      Surrogate - wife Vivian Tarango [id number]    FAMILY HISTORY:  No pertinent family history in first degree relatives    Baseline ADLs (prior to admission):  Independent    Allergies    No Known Allergies    Intolerances    atorvastatin (Joint Pain; Muscle Pain)    Present Symptoms:     Dyspnea: 0 1 2 3   Nausea/Vomiting: Yes No  Anxiety:  Yes No  Depression: Yes No  Fatigue: Yes No  Loss of appetite: Yes No    Pain:             Character-            Duration-            Effect-            Factors-            Frequency-            Location-            Severity-    Review of Systems: Reviewed                     Negative:                     Positive:  Unable to obtain due to poor mentation   All others negative    MEDICATIONS  (STANDING):  sodium chloride 0.9% lock flush 3milliLiter(s) IV Push every 8 hours  digoxin     Tablet 0.25milliGRAM(s) Oral daily  diltiazem    Tablet 90milliGRAM(s) Oral four times a day  rosuvastatin 20milliGRAM(s) Oral at bedtime  docusate sodium 100milliGRAM(s) Oral three times a day  levalbuterol Inhalation 0.63milliGRAM(s) Inhalation every 6 hours  allopurinol 100milliGRAM(s) Oral two times a day after meals  potassium chloride    Tablet ER 20milliEquivalent(s) Oral daily  sertraline 50milliGRAM(s) Oral daily  furosemide   Injectable 40milliGRAM(s) IV Push every 12 hours  dronabinol 2.5milliGRAM(s) Oral daily  apixaban 5milliGRAM(s) Oral two times a day  insulin lispro (HumaLOG) corrective regimen sliding scale  SubCutaneous three times a day before meals  dextrose 5%. 1000milliLiter(s) IV Continuous <Continuous>  dextrose 50% Injectable 12.5Gram(s) IV Push once  dextrose 50% Injectable 25Gram(s) IV Push once  dextrose 50% Injectable 25Gram(s) IV Push once  magnesium oxide 400milliGRAM(s) Oral two times a day with meals    MEDICATIONS  (PRN):  oxyCODONE IR 5milliGRAM(s) Oral every 6 hours PRN Moderate Pain (4 - 6)  guaiFENesin    Syrup 100milliGRAM(s) Oral every 6 hours PRN Cough  senna 2Tablet(s) Oral at bedtime PRN Constipation  zaleplon 5milliGRAM(s) Oral at bedtime PRN Insomnia  dextrose Gel 1Dose(s) Oral once PRN Blood Glucose LESS THAN 70 milliGRAM(s)/deciliter  glucagon  Injectable 1milliGRAM(s) IntraMuscular once PRN Glucose LESS THAN 70 milligrams/deciliter    PHYSICAL EXAM:    Vital Signs Last 24 Hrs  T(C): 36.7, Max: 36.8 (06-15 @ 05:20)  T(F): 98, Max: 98.2 (06-15 @ 05:20)  HR: 87 (70 - 87)  BP: 130/62 (120/64 - 142/72)  BP(mean): --  RR: 20 (20 - 20)  SpO2: 95% (92% - 95%)    General: alert  oriented x ____ lethargic agitated                  cachexia  nonverbal  coma    Karnofsky:  %    HEENT: normal  dry mouth  ET tube/trach    Lungs: comfortable tachypnea/labored breathing  excessive secretions    CV: normal  tachycardia    GI: normal  distended  tender  no BS               PEG/NG/OG tube  constipation  last BM:     : normal  incontinent  oliguria/anuria  roberson    MSK: normal  weakness  edema             ambulatory  bedbound/wheelchair bound    Skin: normal  pressure ulcers- Stage_____  no rash    LABS:                        11.2   8.4   )-----------( 508      ( 14 Jun 2017 05:45 )             34.7     06-14    134<L>  |  90<L>  |  26.0<H>  ----------------------------<  136<H>  3.7   |  31.0<H>  |  0.73    Ca    8.8      14 Jun 2017 05:45  Phos  3.8     06-14  Mg     1.8     06-14    TPro  6.0<L>  /  Alb  2.7<L>  /  TBili  0.3<L>  /  DBili  x   /  AST  27  /  ALT  17  /  AlkPhos  133<H>  06-13    PT/INR - ( 13 Jun 2017 18:35 )   PT: 18.1 sec;   INR: 1.63 ratio       PTT - ( 13 Jun 2017 18:35 )  PTT:35.4 sec    I&O's Summary  I & Os for 24h ending 15 Danny 2017 07:00  =============================================  IN: 650 ml / OUT: 1220 ml / NET: -570 ml    I & Os for current day (as of 15 Danny 2017 13:05)  =============================================  IN: 120 ml / OUT: 0 ml / NET: 120 ml      RADIOLOGY & ADDITIONAL STUDIES:    ADVANCE DIRECTIVES:   DNR YES NO  Completed on:                     MOLST  YES NO   Completed on:  Living Will  YES NO   Completed on: HPI: 67M with PMH as listed admitted 6/13 with worsening and progressive dyspnea.     He was recently admitted 5/24- 5/28 with malignant pleural effusion and right pleurodesis.     PERTINENT PMH REVIEWED: Yes     PAST MEDICAL & SURGICAL HISTORY:  Atrial fibrillation  Pericardial effusion  HTN (hypertension)  Hypercholesteremia  Diabetes  No significant past surgical history  Stage IV Lung cancer, malignant effusion, oxygen dependent    SOCIAL HISTORY:  + past smoker - 30 pack year                                    Admitted from:  home      Surrogate - wife Vivian Tarango [phone]    FAMILY HISTORY:  No pertinent family history in first degree relatives    Baseline ADLs (prior to admission):  Independent    Allergies    No Known Allergies    Intolerances    atorvastatin (Joint Pain; Muscle Pain)    Present Symptoms:     Dyspnea: 1   Nausea/Vomiting: No  Anxiety:  No  Depression: No  Fatigue: Yes   Loss of appetite: No    Pain: none            Character-            Duration-            Effect-            Factors-            Frequency-            Location-            Severity-    Review of Systems: Reviewed                                    Positive: mild shortness of breath on exertion     MEDICATIONS  (STANDING):  sodium chloride 0.9% lock flush 3milliLiter(s) IV Push every 8 hours  digoxin     Tablet 0.25milliGRAM(s) Oral daily  diltiazem    Tablet 90milliGRAM(s) Oral four times a day  rosuvastatin 20milliGRAM(s) Oral at bedtime  docusate sodium 100milliGRAM(s) Oral three times a day  levalbuterol Inhalation 0.63milliGRAM(s) Inhalation every 6 hours  allopurinol 100milliGRAM(s) Oral two times a day after meals  potassium chloride    Tablet ER 20milliEquivalent(s) Oral daily  sertraline 50milliGRAM(s) Oral daily  furosemide   Injectable 40milliGRAM(s) IV Push every 12 hours  dronabinol 2.5milliGRAM(s) Oral daily  apixaban 5milliGRAM(s) Oral two times a day  insulin lispro (HumaLOG) corrective regimen sliding scale  SubCutaneous three times a day before meals  dextrose 5%. 1000milliLiter(s) IV Continuous <Continuous>  dextrose 50% Injectable 12.5Gram(s) IV Push once  dextrose 50% Injectable 25Gram(s) IV Push once  dextrose 50% Injectable 25Gram(s) IV Push once  magnesium oxide 400milliGRAM(s) Oral two times a day with meals    MEDICATIONS  (PRN):  oxyCODONE IR 5milliGRAM(s) Oral every 6 hours PRN Moderate Pain (4 - 6)  guaiFENesin    Syrup 100milliGRAM(s) Oral every 6 hours PRN Cough  senna 2Tablet(s) Oral at bedtime PRN Constipation  zaleplon 5milliGRAM(s) Oral at bedtime PRN Insomnia  dextrose Gel 1Dose(s) Oral once PRN Blood Glucose LESS THAN 70 milliGRAM(s)/deciliter  glucagon  Injectable 1milliGRAM(s) IntraMuscular once PRN Glucose LESS THAN 70 milligrams/deciliter    PHYSICAL EXAM:    Vital Signs Last 24 Hrs  T(C): 36.7, Max: 36.8 (06-15 @ 05:20)  T(F): 98, Max: 98.2 (06-15 @ 05:20)  HR: 87 (70 - 87)  BP: 130/62 (120/64 - 142/72)  BP(mean): --  RR: 20 (20 - 20)  SpO2: 95% (92% - 95%)    General: alert  oriented x 3     Karnofsky:  50%    HEENT: normal      Lungs: comfortable    CV: normal      GI: normal, nondistended     : normal     MSK: weakness, edema     Skin: no rash    LABS:                        11.2   8.4   )-----------( 508      ( 14 Jun 2017 05:45 )             34.7     06-14    134<L>  |  90<L>  |  26.0<H>  ----------------------------<  136<H>  3.7   |  31.0<H>  |  0.73    Ca    8.8      14 Jun 2017 05:45  Phos  3.8     06-14  Mg     1.8     06-14    TPro  6.0<L>  /  Alb  2.7<L>  /  TBili  0.3<L>  /  DBili  x   /  AST  27  /  ALT  17  /  AlkPhos  133<H>  06-13    PT/INR - ( 13 Jun 2017 18:35 )   PT: 18.1 sec;   INR: 1.63 ratio       PTT - ( 13 Jun 2017 18:35 )  PTT:35.4 sec    I&O's Summary  I & Os for 24h ending 15 Danny 2017 07:00  =============================================  IN: 650 ml / OUT: 1220 ml / NET: -570 ml    I & Os for current day (as of 15 Danny 2017 13:05)  =============================================  IN: 120 ml / OUT: 0 ml / NET: 120 ml    RADIOLOGY & ADDITIONAL STUDIES:    ADVANCE DIRECTIVES: DNR/I - MOLST HPI: 67M with PMH as listed admitted 6/13 with worsening and progressive dyspnea.     He was recently admitted 5/24- 5/28 with malignant pleural effusion and right pleurodesis.     PERTINENT PMH REVIEWED: Yes     PAST MEDICAL & SURGICAL HISTORY:  Atrial fibrillation  Pericardial effusion  HTN (hypertension)  Hypercholesteremia  Diabetes  No significant past surgical history  Stage IV Lung cancer, malignant effusion, oxygen dependent    SOCIAL HISTORY:  + past smoker - 30 pack year                                    Admitted from:  home      Surrogate - wife Vivian Tarango 2006    FAMILY HISTORY:  No pertinent family history in first degree relatives    Baseline ADLs (prior to admission):  Independent    Allergies    No Known Allergies    Intolerances    atorvastatin (Joint Pain; Muscle Pain)    Present Symptoms:     Dyspnea: 1   Nausea/Vomiting: No  Anxiety:  No  Depression: No  Fatigue: Yes   Loss of appetite: No    Pain: none            Character-            Duration-            Effect-            Factors-            Frequency-            Location-            Severity-    Review of Systems: Reviewed                                    Positive: mild shortness of breath on exertion     MEDICATIONS  (STANDING):  sodium chloride 0.9% lock flush 3milliLiter(s) IV Push every 8 hours  digoxin     Tablet 0.25milliGRAM(s) Oral daily  diltiazem    Tablet 90milliGRAM(s) Oral four times a day  rosuvastatin 20milliGRAM(s) Oral at bedtime  docusate sodium 100milliGRAM(s) Oral three times a day  levalbuterol Inhalation 0.63milliGRAM(s) Inhalation every 6 hours  allopurinol 100milliGRAM(s) Oral two times a day after meals  potassium chloride    Tablet ER 20milliEquivalent(s) Oral daily  sertraline 50milliGRAM(s) Oral daily  furosemide   Injectable 40milliGRAM(s) IV Push every 12 hours  dronabinol 2.5milliGRAM(s) Oral daily  apixaban 5milliGRAM(s) Oral two times a day  insulin lispro (HumaLOG) corrective regimen sliding scale  SubCutaneous three times a day before meals  dextrose 5%. 1000milliLiter(s) IV Continuous <Continuous>  dextrose 50% Injectable 12.5Gram(s) IV Push once  dextrose 50% Injectable 25Gram(s) IV Push once  dextrose 50% Injectable 25Gram(s) IV Push once  magnesium oxide 400milliGRAM(s) Oral two times a day with meals    MEDICATIONS  (PRN):  oxyCODONE IR 5milliGRAM(s) Oral every 6 hours PRN Moderate Pain (4 - 6)  guaiFENesin    Syrup 100milliGRAM(s) Oral every 6 hours PRN Cough  senna 2Tablet(s) Oral at bedtime PRN Constipation  zaleplon 5milliGRAM(s) Oral at bedtime PRN Insomnia  dextrose Gel 1Dose(s) Oral once PRN Blood Glucose LESS THAN 70 milliGRAM(s)/deciliter  glucagon  Injectable 1milliGRAM(s) IntraMuscular once PRN Glucose LESS THAN 70 milligrams/deciliter    PHYSICAL EXAM:    Vital Signs Last 24 Hrs  T(C): 36.7, Max: 36.8 (06-15 @ 05:20)  T(F): 98, Max: 98.2 (06-15 @ 05:20)  HR: 87 (70 - 87)  BP: 130/62 (120/64 - 142/72)  BP(mean): --  RR: 20 (20 - 20)  SpO2: 95% (92% - 95%)    General: alert  oriented x 3     Karnofsky:  50%    HEENT: normal      Lungs: comfortable    CV: normal      GI: normal, nondistended     : normal     MSK: weakness, edema     Skin: no rash    LABS:                        11.2   8.4   )-----------( 508      ( 14 Jun 2017 05:45 )             34.7     06-14    134<L>  |  90<L>  |  26.0<H>  ----------------------------<  136<H>  3.7   |  31.0<H>  |  0.73    Ca    8.8      14 Jun 2017 05:45  Phos  3.8     06-14  Mg     1.8     06-14    TPro  6.0<L>  /  Alb  2.7<L>  /  TBili  0.3<L>  /  DBili  x   /  AST  27  /  ALT  17  /  AlkPhos  133<H>  06-13    PT/INR - ( 13 Jun 2017 18:35 )   PT: 18.1 sec;   INR: 1.63 ratio       PTT - ( 13 Jun 2017 18:35 )  PTT:35.4 sec    I&O's Summary  I & Os for 24h ending 15 Danny 2017 07:00  =============================================  IN: 650 ml / OUT: 1220 ml / NET: -570 ml    I & Os for current day (as of 15 Danny 2017 13:05)  =============================================  IN: 120 ml / OUT: 0 ml / NET: 120 ml    RADIOLOGY & ADDITIONAL STUDIES:    CXR:   Heart: Cardiomegaly  Mediastinum:  Unremarkable  Lungs/Airways: Stable bilateral pleural effusions and right lung airspace   opacities, right effusion loculated.  Bones/Soft tissues: Unremarkable    ADVANCE DIRECTIVES: DNR/I - MOLST HPI: 67M with PMH as listed admitted 6/13 with worsening and progressive dyspnea found to have progression of cancer.     He was recently admitted 5/24- 5/28 with malignant pleural effusion with right talc pleurodesis.     PERTINENT PMH REVIEWED: Yes     PAST MEDICAL & SURGICAL HISTORY:  Atrial fibrillation  Pericardial effusion  HTN (hypertension)  Hypercholesteremia  Diabetes  No significant past surgical history  Stage IV Lung cancer, malignant effusion, oxygen dependent (diagnosed 7/16)     SOCIAL HISTORY:  + past smoker - 30 pack year                                    Admitted from:  home      Surrogate - wife Vivian Tarango [phone]    FAMILY HISTORY:  No pertinent family history in first degree relatives    Baseline ADLs (prior to admission):  Independent    Allergies    No Known Allergies    Intolerances    atorvastatin (Joint Pain; Muscle Pain)    Present Symptoms:     Dyspnea: 1   Nausea/Vomiting: No  Anxiety:  No  Depression: No  Fatigue: Yes   Loss of appetite: No    Pain: none            Character-            Duration-            Effect-            Factors-            Frequency-            Location-            Severity-    Review of Systems: Reviewed                                    Positive: mild shortness of breath on exertion     MEDICATIONS  (STANDING):  sodium chloride 0.9% lock flush 3milliLiter(s) IV Push every 8 hours  digoxin     Tablet 0.25milliGRAM(s) Oral daily  diltiazem    Tablet 90milliGRAM(s) Oral four times a day  rosuvastatin 20milliGRAM(s) Oral at bedtime  docusate sodium 100milliGRAM(s) Oral three times a day  levalbuterol Inhalation 0.63milliGRAM(s) Inhalation every 6 hours  allopurinol 100milliGRAM(s) Oral two times a day after meals  potassium chloride    Tablet ER 20milliEquivalent(s) Oral daily  sertraline 50milliGRAM(s) Oral daily  furosemide   Injectable 40milliGRAM(s) IV Push every 12 hours  dronabinol 2.5milliGRAM(s) Oral daily  apixaban 5milliGRAM(s) Oral two times a day  insulin lispro (HumaLOG) corrective regimen sliding scale  SubCutaneous three times a day before meals  dextrose 5%. 1000milliLiter(s) IV Continuous <Continuous>  dextrose 50% Injectable 12.5Gram(s) IV Push once  dextrose 50% Injectable 25Gram(s) IV Push once  dextrose 50% Injectable 25Gram(s) IV Push once  magnesium oxide 400milliGRAM(s) Oral two times a day with meals    MEDICATIONS  (PRN):  oxyCODONE IR 5milliGRAM(s) Oral every 6 hours PRN Moderate Pain (4 - 6)  guaiFENesin    Syrup 100milliGRAM(s) Oral every 6 hours PRN Cough  senna 2Tablet(s) Oral at bedtime PRN Constipation  zaleplon 5milliGRAM(s) Oral at bedtime PRN Insomnia  dextrose Gel 1Dose(s) Oral once PRN Blood Glucose LESS THAN 70 milliGRAM(s)/deciliter  glucagon  Injectable 1milliGRAM(s) IntraMuscular once PRN Glucose LESS THAN 70 milligrams/deciliter    PHYSICAL EXAM:    Vital Signs Last 24 Hrs  T(C): 36.7, Max: 36.8 (06-15 @ 05:20)  T(F): 98, Max: 98.2 (06-15 @ 05:20)  HR: 87 (70 - 87)  BP: 130/62 (120/64 - 142/72)  BP(mean): --  RR: 20 (20 - 20)  SpO2: 95% (92% - 95%)    General: alert  oriented x 3     Karnofsky:  50%    HEENT: normal      Lungs: comfortable    CV: normal      GI: normal, nondistended     : normal     MSK: weakness, edema     Skin: no rash    LABS:                        11.2   8.4   )-----------( 508      ( 14 Jun 2017 05:45 )             34.7     06-14    134<L>  |  90<L>  |  26.0<H>  ----------------------------<  136<H>  3.7   |  31.0<H>  |  0.73    Ca    8.8      14 Jun 2017 05:45  Phos  3.8     06-14  Mg     1.8     06-14    TPro  6.0<L>  /  Alb  2.7<L>  /  TBili  0.3<L>  /  DBili  x   /  AST  27  /  ALT  17  /  AlkPhos  133<H>  06-13    PT/INR - ( 13 Jun 2017 18:35 )   PT: 18.1 sec;   INR: 1.63 ratio       PTT - ( 13 Jun 2017 18:35 )  PTT:35.4 sec    I&O's Summary  I & Os for 24h ending 15 Danny 2017 07:00  =============================================  IN: 650 ml / OUT: 1220 ml / NET: -570 ml    I & Os for current day (as of 15 Danny 2017 13:05)  =============================================  IN: 120 ml / OUT: 0 ml / NET: 120 ml    RADIOLOGY & ADDITIONAL STUDIES:    CXR:   Heart: Cardiomegaly  Mediastinum:  Unremarkable  Lungs/Airways: Stable bilateral pleural effusions and right lung airspace   opacities, right effusion loculated.  Bones/Soft tissues: Unremarkable    ADVANCE DIRECTIVES: DNR/I - MOLST

## 2017-06-15 NOTE — CONSULT NOTE ADULT - PROBLEM SELECTOR RECOMMENDATION 2
-will try low dose opiates for symptomatic relief. While I was in the room with him and his family, he elected to take off the high flow, and had a 20 minutes long conversation and appeared comfortable to me. He does not like or want to wear the high flow, and would like to be back on his home nasal cannula. He understands risks of being off the high flow.

## 2017-06-15 NOTE — CONSULT NOTE ADULT - ATTENDING COMMENTS
Per wife, the Marinol has not helped him and she would like to eliminate unnecessary medications.     Face to face meeting to discuss Advanced Care Planning - Time Spent 25 Minutes.  See goals of care note.      Thank you for the opportunity to assist with the care of this patient.   [facility] [phone].

## 2017-06-15 NOTE — CHART NOTE - NSCHARTNOTEFT_GEN_A_CORE
Case d/w dr. jensen  I personally ultrasounded pt Left chest,  small effusion at base.  Not sure evacuating this fluid will improve pts symptoms.  D/c dr. jensen, elected not to place pigtail at this time  Long discussion with family and the pt, they understand  will cont to follow

## 2017-06-15 NOTE — CONSULT NOTE ADULT - PROBLEM SELECTOR RECOMMENDATION 5
-ACP discussion with patient and his family. They all understand the overall prognosis is poor. He would like to live out his life at home with quality and his family around him. I explained that we do not have a crystal ball, and at this point goals would be to focus on living his life to the fullest, however long that may be. There is no need to focus on dying or the negatives. I confirmed directives and cosigned the MOLST initially filled out by Dr. Gonzalez with the family. I discussed openly with them regarding the option of hospice services at home. I explained the benefit to a degree and recommended they meet with a liaison to discuss further. He and his family both agreed. He has a great family support system, and would like to have things in place at home so he does not have to come back and forth to the hospital.

## 2017-06-16 NOTE — DISCHARGE NOTE ADULT - PROVIDER TOKENS
FREE:[LAST:[davey],FIRST:[emili],PHONE:[(   )    -],FAX:[(   )    -],ADDRESS:[[address]FREE:[LAST:[davey],FIRST:[emili],PHONE:[phone]],FAX:[(   )    -]]

## 2017-06-16 NOTE — DISCHARGE NOTE ADULT - MEDICATION SUMMARY - MEDICATIONS TO TAKE
I will START or STAY ON the medications listed below when I get home from the hospital:    DME  -- Oxygen portable and concentrator with refill   O2 SAT 86% at rest on RA  O2 @ 4 L/min via NC continous 92%  DX:  Lung Ca  UNA 99 MTHS     -- Indication: For Copd    Opdivo 10 mg/mL intravenous solution  -- 240 milligram(s) intravenous 2 times a month  -- Indication: For Malignant neoplasm of lung, unspecified laterality, unspecified part of lung    dilTIAZem 360 mg/24 hours oral capsule, extended release  -- 1 cap(s) by mouth once a day  -- Indication: For Htn    digoxin 250 mcg (0.25 mg) oral tablet  -- 1 tab(s) by mouth once a day  -- Indication: For Atrial fibrillation, unspecified type    Eliquis 5 mg oral tablet  -- 1 tab(s) by mouth 2 times a day  -- Indication: For Atrial fibrillation, unspecified type    sertraline 50 mg oral tablet  -- 1 tab(s) by mouth once a day  -- Indication: For depression    metFORMIN  -- 750 milligram(s) by mouth 2 times a day  -- Indication: For dm    allopurinol 100 mg oral tablet  -- 1 tab(s) by mouth 2 times a day (after meals)  -- Indication: For gout    furosemide 40 mg oral tablet  -- 1 tab(s) by mouth every 12 hours  -- Indication: For Chf    guaiFENesin 100 mg/5 mL oral liquid  -- 5 milliliter(s) by mouth every 6 hours, As needed, Cough  -- Indication: For Copd    docusate sodium 100 mg oral capsule  -- 1 cap(s) by mouth 3 times a day  -- Indication: For Constipation    senna oral tablet  -- 2 tab(s) by mouth once a day (at bedtime), As needed, Constipation  -- Indication: For  constipaiton    potassium chloride 10 mEq oral tablet, extended release  -- 1 tab(s) by mouth once a day  -- Indication: For Chf    Glucosamine Chondroitin Advanced oral tablet  -- 1500 milligram(s) by mouth once a day  -- Indication: For Pain

## 2017-06-16 NOTE — PROGRESS NOTE ADULT - PROBLEM SELECTOR PLAN 5
-support and education provided to patient and wife at bedside regarding potential progressive symptoms and how to treat those symptoms. Hospice to see patient today for home services.

## 2017-06-16 NOTE — DISCHARGE NOTE ADULT - HOSPITAL COURSE
ADVANCE DIRECTIVES: DNR/I - MOLST     Assessment and Plan:   · Assessment		  67M with stage IV lung cancer with progressive disease.  Plan to discharge home on hospice    Problem/Plan - 1:  ·  Problem: Malignant neoplasm of lung, unspecified laterality, unspecified part of lung.  Plan: -not a candidate for further treatments.     Problem/Plan - 2:  ·  Problem: Shortness of breath.  Plan: -doing better with morphine, educated regarding roxanul.  Continue home with Morphine as provided by Hospice    Problem/Plan - 3:  ·  Problem: Atrial fibrillation, unspecified type.  Continue medication as ordered IV lasix change to po q 12 per MD    Problem/Plan - 4:  ·  Problem: Pleural effusion.  Plan: -not a candidate for any kind of drainage or CT surgery intervention.     Problem/Plan - 5:  ·  Problem: Palliative care encounter.  Plan: -support and education provided to patient and wife at bedside regarding potential progressive symptoms and how to treat those symptoms. Home with hospice. ADVANCE DIRECTIVES: DNR/I - MOLST     Assessment and Plan:   · Assessment		  67M with stage IV lung cancer with progressive disease.  Pulmonary, thoracic Sx, Oncology, Cardiology, palliative consults were obtained during the hospital stay. Plan to discharge home on hospice    Problem/Plan - 1:  ·  Problem: Malignant neoplasm of lung, unspecified laterality, unspecified part of lung.  Plan: -not a candidate for further treatments.     Problem/Plan - 2:  ·  Problem: Shortness of breath.  Plan: -doing better with morphine, educated regarding roxanul.  Continue home with Morphine as provided by Hospice    Problem/Plan - 3:  ·  Problem: Atrial fibrillation, unspecified type.  Continue medication as ordered IV lasix change to po q 12 per MD    Problem/Plan - 4:  ·  Problem: Pleural effusion.  Plan: -not a candidate for any kind of drainage or CT surgery intervention.     Problem/Plan - 5:  ·  Problem: Palliative care encounter.  Plan: -support and education provided to patient and wife at bedside regarding potential progressive symptoms and how to treat those symptoms. Home with hospice.

## 2017-06-16 NOTE — PROGRESS NOTE ADULT - ATTENDING COMMENTS
Thank you for the opportunity to assist with the care of this patient.   [facility] Consult Service [phone].

## 2017-06-16 NOTE — DISCHARGE NOTE ADULT - CARE PROVIDER_API CALL
emili mariscal  Hemo[place]onco  Phone: (   )    -  Fax: (   )    -    emili mariscal  Phone: [phone]  Fax: (   )    -

## 2017-06-16 NOTE — DISCHARGE NOTE ADULT - PATIENT PORTAL LINK FT
“You can access the Follow[facility] Patient Portal, offered by [facility], by registering with the following website: [email or web address]”

## 2017-06-16 NOTE — DISCHARGE NOTE ADULT - CARE PLAN
Principal Discharge DX:	Malignant neoplasm of lung, unspecified laterality, unspecified part of lung  Goal:	comfort  Instructions for follow-up, activity and diet:	as tolerated

## 2017-06-16 NOTE — DISCHARGE NOTE ADULT - MEDICATION SUMMARY - MEDICATIONS TO CHANGE
I will SWITCH the dose or number of times a day I take the medications listed below when I get home from the hospital:    Lasix 40 mg oral tablet  -- 1 tab(s) by mouth once a day

## 2017-06-16 NOTE — PROGRESS NOTE ADULT - ASSESSMENT
66y/o M. with hx of HTN, HLD, DM, Afib on eliquis, and stage IV lung CA presenting with increasing SOB/ANDRES x 3 weeks. Admitted initially on Thoracic Sx service , transferred to Medicine. Not enough Pleural fluid for draining. Cardiology consulted - started on iv lasix as he clinically appears volume overload - multifactorial hypoprotenemia + Tumor burden.  patient is asking to be discharge home today,. home hospice arranged. Overall poor prognosis.     -Peural effusion: continue lasix  Q12 and Xopenex Neb Q6h.  minimal fluid on imaging - unable to undergo IR drainage procedure. No surgical options per Thoracic Surgery.  - Rt Lung cancer - adenocarcinoma - S/p pleurodesis - heavy tumor burden - oncology recs - reviewed. Palliative consult recs apprecaited  -Afib: continue cardizem 90mg PO QID and digoxin 0.25mg PO daily.    - Severe protein calorie malnutrition  -VTE ppx: SCD  hospice eval

## 2017-06-16 NOTE — PROGRESS NOTE ADULT - ASSESSMENT
67M with stage IV lung cancer with progressive disease, shortness of breath, doing a bit better today.

## 2017-06-16 NOTE — PROGRESS NOTE ADULT - SUBJECTIVE AND OBJECTIVE BOX
OVERNIGHT EVENTS: he finally slept last night and breathing more comfortably, has been on nasal cannula since I saw him on initial consultation yesterday at about 3pm.     Present Symptoms:     Dyspnea: 0  Nausea/Vomiting: No  Anxiety:  No  Depression: No  Fatigue: Yes   Loss of appetite: yes     Pain: none             Character-            Duration-            Effect-            Factors-            Frequency-            Location-            Severity-    Review of Systems: Reviewed                     Negative: no chest pain                All others negative    MEDICATIONS  (STANDING):  sodium chloride 0.9% lock flush 3milliLiter(s) IV Push every 8 hours  digoxin     Tablet 0.25milliGRAM(s) Oral daily  diltiazem    Tablet 90milliGRAM(s) Oral four times a day  rosuvastatin 20milliGRAM(s) Oral at bedtime  docusate sodium 100milliGRAM(s) Oral three times a day  levalbuterol Inhalation 0.63milliGRAM(s) Inhalation every 6 hours  allopurinol 100milliGRAM(s) Oral two times a day after meals  potassium chloride    Tablet ER 20milliEquivalent(s) Oral daily  sertraline 50milliGRAM(s) Oral daily  furosemide   Injectable 40milliGRAM(s) IV Push every 12 hours  apixaban 5milliGRAM(s) Oral two times a day  insulin lispro (HumaLOG) corrective regimen sliding scale  SubCutaneous three times a day before meals  dextrose 5%. 1000milliLiter(s) IV Continuous <Continuous>  dextrose 50% Injectable 12.5Gram(s) IV Push once  dextrose 50% Injectable 25Gram(s) IV Push once  dextrose 50% Injectable 25Gram(s) IV Push once  morphine  - Injectable 2milliGRAM(s) IV Push at bedtime    MEDICATIONS  (PRN):  guaiFENesin    Syrup 100milliGRAM(s) Oral every 6 hours PRN Cough  senna 2Tablet(s) Oral at bedtime PRN Constipation  dextrose Gel 1Dose(s) Oral once PRN Blood Glucose LESS THAN 70 milliGRAM(s)/deciliter  glucagon  Injectable 1milliGRAM(s) IntraMuscular once PRN Glucose LESS THAN 70 milligrams/deciliter  morphine Concentrate 5milliGRAM(s) SubLingual every 4 hours PRN dyspnea    PHYSICAL EXAM:    Vital Signs Last 24 Hrs  T(C): 36.7, Max: 36.8 (06-16 @ 05:08)  T(F): 98, Max: 98.3 (06-16 @ 05:08)  HR: 89 (79 - 92)  BP: 110/58 (110/58 - 138/64)  BP(mean): --  RR: 20 (18 - 20)  SpO2: 91% (88% - 93%)    General: alert and oriented     Karnofsky:  30-40%    HEENT: normal     Lungs: comfortable    CV: normal      GI: normal      : normal      MSK: weakness; edema     Skin:  no rash    LABS:                      11.9   9.6   )-----------( 496      ( 16 Jun 2017 05:54 )             37.1     06-16    135  |  91<L>  |  28.0<H>  ----------------------------<  143<H>  4.3   |  29.0  |  0.79    Ca    8.8      16 Jun 2017 05:54  Mg     2.1     06-16    I&O's Summary    I & Os for current day (as of 16 Jun 2017 11:56)  =============================================  IN: 720 ml / OUT: 930 ml / NET: -210 ml    RADIOLOGY & ADDITIONAL STUDIES: nothing new     ADVANCE DIRECTIVES: DNR/I - MOLST

## 2017-06-16 NOTE — PROGRESS NOTE ADULT - SUBJECTIVE AND OBJECTIVE BOX
CARINA LAGOS    [id number]    67y      Male    CC: SOB    INTERVAL HPI/OVERNIGHT EVENTS: no acute events    REVIEW OF SYSTEMS:    CONSTITUTIONAL: No fever, weight loss, or fatigue        Vital Signs Last 24 Hrs  T(C): 36.8, Max: 36.8 (06-16 @ 05:08)  T(F): 98.2, Max: 98.3 (06-16 @ 05:08)  HR: 80 (79 - 92)  BP: 140/60 (110/58 - 140/60)  BP(mean): --  RR: 20 (18 - 20)  SpO2: 90% (88% - 93%)    PHYSICAL EXAM:    GENERAL: mild respiratory distress  HEENT: PERRL, +EOMI  NECK: soft, Supple, No JVD,   CHEST/LUNG: Clear to percussion bilaterally; AE decreased RT>Lt , tachypenic  HEART: S1S2+, Regular rate and rhythm; No murmurs, rubs, or gallops  EXTREMITIES: No clubbing, cyanosis, or1+ edema  SKIN: No rashes or lesions  NEURO: AAOX3, no focal deficits    LABS:                        11.9   9.6   )-----------( 496      ( 16 Jun 2017 05:54 )             37.1     06-16    135  |  91<L>  |  28.0<H>  ----------------------------<  143<H>  4.3   |  29.0  |  0.79    Ca    8.8      16 Jun 2017 05:54  Mg     2.1     06-16              MEDICATIONS  (STANDING):  sodium chloride 0.9% lock flush 3milliLiter(s) IV Push every 8 hours  digoxin     Tablet 0.25milliGRAM(s) Oral daily  diltiazem    Tablet 90milliGRAM(s) Oral four times a day  docusate sodium 100milliGRAM(s) Oral three times a day  levalbuterol Inhalation 0.63milliGRAM(s) Inhalation every 6 hours  allopurinol 100milliGRAM(s) Oral two times a day after meals  potassium chloride    Tablet ER 20milliEquivalent(s) Oral daily  sertraline 50milliGRAM(s) Oral daily  apixaban 5milliGRAM(s) Oral two times a day  insulin lispro (HumaLOG) corrective regimen sliding scale  SubCutaneous three times a day before meals  dextrose 5%. 1000milliLiter(s) IV Continuous <Continuous>  dextrose 50% Injectable 12.5Gram(s) IV Push once  dextrose 50% Injectable 25Gram(s) IV Push once  dextrose 50% Injectable 25Gram(s) IV Push once  morphine  - Injectable 2milliGRAM(s) IV Push at bedtime  furosemide    Tablet 40milliGRAM(s) Oral every 12 hours    MEDICATIONS  (PRN):  guaiFENesin    Syrup 100milliGRAM(s) Oral every 6 hours PRN Cough  senna 2Tablet(s) Oral at bedtime PRN Constipation  dextrose Gel 1Dose(s) Oral once PRN Blood Glucose LESS THAN 70 milliGRAM(s)/deciliter  glucagon  Injectable 1milliGRAM(s) IntraMuscular once PRN Glucose LESS THAN 70 milligrams/deciliter  morphine Concentrate 5milliGRAM(s) SubLingual every 4 hours PRN dyspnea      RADIOLOGY & ADDITIONAL TESTS:

## 2017-06-16 NOTE — DISCHARGE NOTE ADULT - MEDICATION SUMMARY - MEDICATIONS TO STOP TAKING
I will STOP taking the medications listed below when I get home from the hospital:    Crestor 20 mg oral tablet  -- 1 tab(s) by mouth once a day (at bedtime)

## 2017-07-20 ENCOUNTER — APPOINTMENT (OUTPATIENT)
Dept: CARDIOLOGY | Facility: CLINIC | Age: 67
End: 2017-07-20

## 2017-09-08 ENCOUNTER — APPOINTMENT (OUTPATIENT)
Dept: PULMONOLOGY | Facility: CLINIC | Age: 67
End: 2017-09-08

## 2017-09-29 NOTE — ED ADULT TRIAGE NOTE - BP NONINVASIVE SYSTOLIC (MM HG)
138
Urology called   UA pending   patient to be bladder scanned; if retaining patient to have 20 F garcia placed with potential for CBI  holding AC at this time secondary to bleeding   potential CT scan of pelvis in the future   serial CBC   follow up labs

## 2018-09-03 PROBLEM — R60.0 BILATERAL EDEMA OF LOWER EXTREMITY: Status: ACTIVE | Noted: 2017-01-01

## 2018-11-19 NOTE — DISCHARGE NOTE ADULT - HOME CARE AGENCY
Spoke with patient's wife and made her aware of results. She voiced understanding and will make appointment for him after the holidays. [place] [phone]

## 2018-12-04 NOTE — ED ADULT TRIAGE NOTE - WEIGHT IN LBS
Telephone Encounter by Oanh Laird at 05/08/18 11:24 AM     Author:  Oanh Laird Service:  (none) Author Type:  Patient      Filed:  05/08/18 11:25 AM Encounter Date:  5/8/2018 Status:  Signed     :  Oanh Laird (Patient )              LEONEL GUERRIER    Patient Age: 52 year old    ACCT STATUS:   MESSAGE:[GC1.1T]   Patient's wife would like to know if theres anything else patient can take instead of potassium citrate. Mediation is getting too costly. Please advise.[GC1.1M]   Next and Last Visit with Provider and Department  Next visit with JEROME QUINN is on No match found  Next visit with [facility] is on No match found  Last visit with JEROME QUINN was on 12/29/2017 at  1:55 PM in [facility]  Last visit with [facility] was on 12/29/2017 at  1:55 PM in [facility]     WEIGHT AND HEIGHT: As of 11/02/2017 weight is 167.5 lbs.(75.978 kg). Height is 6' 0\"(1.829 m).   BMI is 22.71 kg/(m^2) calculated from:     Height 6' 0\" (1.829 m) as of 11/2/17     Weight 167 lb 8 oz (75.978 kg) as of 11/2/17      Allergies      Allergen   Reactions   • Cat Hair Extract  Swelling   • Penicillins       swelling and rash      Current outpatient prescriptions       Medication  Sig Dispense Refill   • pantoprazole (PROTONIX) 40 MG tablet TAKE ONE TABLET BY MOUTH ONCE DAILY 90 Tab 1   • losartan-hydrochlorothiazide (HYZAAR) 50-12.5 MG per tablet TAKE ONE TABLET BY MOUTH ONCE DAILY 90 Tab 1   • meloxicam (MOBIC) 15 MG tablet Take 1 Tab by mouth daily. 30 Tab 0   • Tamsulosin HCl 0.4 MG CAPS Take 1 Cap by mouth daily. 90 Cap 3   • Potassium Citrate 10 MEQ (1080 MG) TBCR Take 10 mEq by mouth 3 (three) times daily. 270 Tab 3   • allopurinol (ZYLOPRIM) 100 MG tablet TAKE ONE TABLET BY MOUTH ONCE DAILY 90 Tab 1   • albuterol (PROVENTIL) (2.5 MG/3ML) 0.083% nebulizer solution Inhale 3 mL by mouth every 6 (six) hours as needed for Wheezing. 150 mL 0    • Vitamin D, Ergocalciferol, 01680 UNITS CAPS   0   • desonide (DESOWEN) 0.05 % cream Apply twice daily to affected areas skin around eyes for 2 weeks 60 g 2   • clobetasol (TEMOVATE) 0.05 % cream Apply twice daily strictly to hands for 2 weeks; AVOID use on face and body folds 60 g 1      PHARMACY to use:[GC1.1T] see below[GC1.1M]          Pharmacy preference(s) on file:    [facility] [address]  [facility] [address]    CALL BACK INFO:[GC1.1T] Ok to leave response (including medical information) with family member or on answering machine[GC1.1M]  ROUTING:[GC1.1T] Patient's physician/staff[GC1.1M]        PCP: Jaylon Bowman MD         INS: Payor: D[employer] PPO/HMO / Plan: N/A / Product Type: *No Product type* / Note: This is the primary coverage, but no account was found for this location or the patient's primary location.   ADDRESS:  [address] [place] [id number][GC1.1T]       Revision History        User Key Date/Time User Provider Type Action    > GC1.1 05/08/18 11:25 AM Oanh Laird Patient  Sign    M - Manual, T - Template             [id number]

## 2020-02-10 NOTE — DISCHARGE NOTE ADULT - CARE PLAN
Principal Discharge DX:	Lung mass  Goal:	Recover from surgery  Instructions for follow-up, activity and diet:	Please call [facility] at [phone] to arrange a follow up with Dr. Rebolledo.  Please come to ED or Call Cardio thoracic office at [phone] if Chest pain, Shortness of Breath, persistent Nausea & vomiting, oozing from wounds, 2 lb increase in weight in 24 hours.  Secondary Diagnosis:	Pleural effusion  Instructions for follow-up, activity and diet:	Continue lasix & potassium  Secondary Diagnosis:	Atrial fibrillation  Instructions for follow-up, activity and diet:	Continue cardizem & digoxin [STD (testing, results, tx)] : STD (testing, results, tx) [Contraception] : contraception Principal Discharge DX:	Lung mass  Goal:	Recover from surgery  Instructions for follow-up, activity and diet:	Please call [facility] at [phone] to arrange a follow up with Dr. Rebolledo.  Please come to ED or Call Cardio thoracic office at [phone] if Chest pain, Shortness of Breath, persistent Nausea & vomiting, oozing from wounds, 2 lb increase in weight in 24 hours.  Secondary Diagnosis:	Pleural effusion  Instructions for follow-up, activity and diet:	Continue lasix & potassium  Secondary Diagnosis:	Atrial fibrillation  Instructions for follow-up, activity and diet:	Continue cardizem & digoxin Principal Discharge DX:	Lung mass  Goal:	Recover from surgery  Instructions for follow-up, activity and diet:	Please call [facility] at [phone] to arrange a follow up with Dr. Rebolledo.  Please come to ED or Call Cardio thoracic office at [phone] if Chest pain, Shortness of Breath, persistent Nausea & vomiting, oozing from wounds, 2 lb increase in weight in 24 hours.  Secondary Diagnosis:	Pleural effusion  Instructions for follow-up, activity and diet:	Continue lasix & potassium  Secondary Diagnosis:	Atrial fibrillation  Instructions for follow-up, activity and diet:	Continue cardizem & digoxin

## 2020-04-13 NOTE — ED PROVIDER NOTE - NS ED MD SCRIBE ATTENDING SCRIBE SECTIONS
General PHYSICAL EXAM/VITAL SIGNS( Pullset)/DISPOSITION/PAST MEDICAL/SURGICAL/SOCIAL HISTORY/HISTORY OF PRESENT ILLNESS/REVIEW OF SYSTEMS

## 2020-09-25 NOTE — CONSULT NOTE ADULT - CONSULT REQUESTED BY NAME
Nice meeting you today, Sra. Mcduffie    Today, we focused on your diabetes management. I would like for you to schedule another appointment for an annual physical since it has been approximately two years since last seen in our clinic.    For your diabetes management, we did the followin) Referral to opthomologist ROBINSON Lou , please call his clinic to schedule an appointment for your annual diabetes eye exam  2) Refilled Metformin and Glipizide  3) Ordered labs, please complete today at the 1st floor lab  4) Provided you the flu and pneumo vaccine today.    Arabella Soto MD     Francisco

## 2021-06-16 NOTE — PATIENT PROFILE ADULT. - PRO INTERPRETER NEED 2
Due to patient being non-English speaking/uses sign language, an  was used for this visit. Only for face-to-face interpretation by an external agency, date and length of interpretation can be found on the scanned worksheet.       name: Silvino Banks (Htoo)  Language: Radha  Agency:  [employer]  Phone number: [phone]  Type of interpretation:  Face-to-face, spoken     English

## 2021-10-15 NOTE — PATIENT PROFILE ADULT. - PROVIDER NOTIFICATION
Initial liver disease    Referral in Epic    Referred by Michael D'Amico, MD    DX: cirrhosis of liver    Records and insurance in Epic    Initial appointment scheduled with Dr. Williamson on 11/5/21.  New patient info mailed.   Declines

## 2021-12-18 NOTE — ED PROVIDER NOTE - PROGRESS NOTE DETAILS
[phone] ct scan as noted spoke with cardiothoracic pa sunny shell seen by ct pa will discuss with  regarding tx spoke with sunny pryor pt will be admitted to medicine they will consult and place chest cath to drain effusion,  admitting aware

## 2022-05-23 NOTE — PHYSICAL THERAPY INITIAL EVALUATION ADULT - ASSISTIVE DEVICE FOR TRANSFER: BED/CHAIR, REHAB EVAL
rolling walker Performing Laboratory: 0 Expected Date Of Service: 05/23/2022 Bill For Surgical Tray: no Billing Type: Third-Party Bill

## 2023-06-19 NOTE — DISCHARGE NOTE ADULT - HOSPITAL COURSE
67M with a history of stage 4 lung cancer (s/p chemotherapy, now on opdivo) and prior pericardial effusion presents with dyspnea and bilateral pleural effusions.  underwent bilateral chest tube placement by CT surgery, improved respiratory status with chest tube subsequently removed.  Seen by cardiology, diltiazem dose increased and eliquis continued. no evidence of overt heart failure. Seen by oncology with outpatient follow up planned.  Pleural effusion is exudative, likely malignant.    d/c home in stable condition. d/c planning 45 min. updated wife and patient in detail.  VSS afebrile, no acute complaints, feels well and ambulatory. irreg irreg s1s2 decreased bs at bases, soft +BS trace edema. Yes 67M with a history of stage 4 lung cancer (s/p chemotherapy, now on opdivo) and prior pericardial effusion presents with dyspnea and bilateral pleural effusions.  underwent bilateral chest tube placement by CT surgery, improved respiratory status with chest tube subsequently removed.  Seen by cardiology, diltiazem dose increased and eliquis continued. no evidence of overt heart failure. Seen by oncology with outpatient follow up planned.  Pleural effusion is exudative, likely malignant.    d/c home in stable condition. d/c planning 45 min. updated wife and patient in detail. aware of possibility of reaccumulation of pleural effusions.  VSS afebrile, no acute complaints, feels well and ambulatory. irreg irreg s1s2 decreased bs at bases, soft +BS trace edema.

## 2024-01-05 NOTE — DIETITIAN INITIAL EVALUATION ADULT. - NS FNS WEIGHT USED FOR CALC
Problem: Pain  Goal: #Acceptable pain level achieved/maintained at rest using NRS/Faces  Description: This goal is used for patients who can self-report.  Acceptable means the level is at or below the identified comfort/function goal.  Outcome: Outcome Met, Continue evaluating goal progress toward completion  Goal: # Acceptable pain level achieved/maintained at rest using NRS/Faces without oversedation (opioid naive or PCA/Epidural infusion)  Description: This goal is used if Opioid-naïve or on PCA/Epidural Infusion.  Outcome: Outcome Met, Continue evaluating goal progress toward completion  Goal: # Verbalizes understanding of pain management  Description: Documented in Patient Education Activity  Outcome: Outcome Met, Continue evaluating goal progress toward completion     Problem: At Risk for Falls  Goal: # Patient does not fall  Outcome: Outcome Met, Continue evaluating goal progress toward completion  Goal: # Takes action to control fall-related risks  Outcome: Outcome Met, Continue evaluating goal progress toward completion  Goal: # Verbalizes understanding of fall risk/precautions  Description: Document education using the patient education activity  Outcome: Outcome Met, Continue evaluating goal progress toward completion     Problem: At Risk for Injury Due to Fall  Goal: # Patient does not fall  Outcome: Outcome Met, Continue evaluating goal progress toward completion  Goal: # Takes action to control condition specific risks  Outcome: Outcome Met, Continue evaluating goal progress toward completion  Goal: # Verbalizes understanding of fall-related injury personal risks  Description: Document education using the patient education activity  Outcome: Outcome Met, Continue evaluating goal progress toward completion      current

## 2024-03-22 NOTE — PATIENT PROFILE ADULT. - NUTRITION PROFILE
BMI greater than 40
- home med: rosuvastatin 10mg daily  - therapeutic exchange with atorvastatin while inpt   - check lipid profile